# Patient Record
Sex: FEMALE | URBAN - METROPOLITAN AREA
[De-identification: names, ages, dates, MRNs, and addresses within clinical notes are randomized per-mention and may not be internally consistent; named-entity substitution may affect disease eponyms.]

---

## 2017-08-21 DIAGNOSIS — O36.80X0 ENCOUNTER TO DETERMINE FETAL VIABILITY OF PREGNANCY, NOT APPLICABLE OR UNSPECIFIED FETUS: Primary | ICD-10-CM

## 2017-08-22 ENCOUNTER — TRANSFERRED RECORDS (OUTPATIENT)
Dept: HEALTH INFORMATION MANAGEMENT | Facility: CLINIC | Age: 37
End: 2017-08-22

## 2017-08-22 ENCOUNTER — PRENATAL OFFICE VISIT (OUTPATIENT)
Dept: OBGYN | Facility: CLINIC | Age: 37
End: 2017-08-22
Payer: COMMERCIAL

## 2017-08-22 ENCOUNTER — RADIANT APPOINTMENT (OUTPATIENT)
Dept: ULTRASOUND IMAGING | Facility: CLINIC | Age: 37
End: 2017-08-22
Payer: COMMERCIAL

## 2017-08-22 ENCOUNTER — TELEPHONE (OUTPATIENT)
Dept: NURSING | Facility: CLINIC | Age: 37
End: 2017-08-22

## 2017-08-22 VITALS
WEIGHT: 132.6 LBS | BODY MASS INDEX: 26.73 KG/M2 | HEIGHT: 59 IN | DIASTOLIC BLOOD PRESSURE: 70 MMHG | SYSTOLIC BLOOD PRESSURE: 114 MMHG

## 2017-08-22 DIAGNOSIS — Z3A.13 13 WEEKS GESTATION OF PREGNANCY: ICD-10-CM

## 2017-08-22 DIAGNOSIS — O09.291 HISTORY OF GESTATIONAL DIABETES IN PRIOR PREGNANCY, CURRENTLY PREGNANT, FIRST TRIMESTER: ICD-10-CM

## 2017-08-22 DIAGNOSIS — R00.2 PALPITATIONS: ICD-10-CM

## 2017-08-22 DIAGNOSIS — Z86.32 HISTORY OF GESTATIONAL DIABETES IN PRIOR PREGNANCY, CURRENTLY PREGNANT, FIRST TRIMESTER: ICD-10-CM

## 2017-08-22 DIAGNOSIS — Z98.891 H/O CESAREAN SECTION: ICD-10-CM

## 2017-08-22 DIAGNOSIS — O36.80X0 ENCOUNTER TO DETERMINE FETAL VIABILITY OF PREGNANCY, NOT APPLICABLE OR UNSPECIFIED FETUS: ICD-10-CM

## 2017-08-22 DIAGNOSIS — Z12.4 SCREENING FOR CERVICAL CANCER: ICD-10-CM

## 2017-08-22 DIAGNOSIS — B37.31 YEAST VAGINITIS: ICD-10-CM

## 2017-08-22 DIAGNOSIS — O09.529 SUPERVISION OF HIGH-RISK PREGNANCY OF ELDERLY MULTIGRAVIDA: Primary | ICD-10-CM

## 2017-08-22 DIAGNOSIS — O09.291 HX OF PREECLAMPSIA, PRIOR PREGNANCY, CURRENTLY PREGNANT, FIRST TRIMESTER: ICD-10-CM

## 2017-08-22 PROBLEM — O09.299 HISTORY OF GESTATIONAL DIABETES IN PRIOR PREGNANCY, CURRENTLY PREGNANT: Status: ACTIVE | Noted: 2017-08-22

## 2017-08-22 LAB
ABO + RH BLD: NORMAL
ABO + RH BLD: NORMAL
ALBUMIN UR-MCNC: NEGATIVE MG/DL
APPEARANCE UR: CLEAR
BACTERIA #/AREA URNS HPF: ABNORMAL /HPF
BASOPHILS # BLD AUTO: 0 10E9/L (ref 0–0.2)
BASOPHILS NFR BLD AUTO: 0.1 %
BILIRUB UR QL STRIP: NEGATIVE
BLD GP AB SCN SERPL QL: NORMAL
BLOOD BANK CMNT PATIENT-IMP: NORMAL
COLOR UR AUTO: YELLOW
DIFFERENTIAL METHOD BLD: NORMAL
EOSINOPHIL # BLD AUTO: 0.1 10E9/L (ref 0–0.7)
EOSINOPHIL NFR BLD AUTO: 0.8 %
ERYTHROCYTE [DISTWIDTH] IN BLOOD BY AUTOMATED COUNT: 14 % (ref 10–15)
GLUCOSE 1H P 50 G GLC PO SERPL-MCNC: 158 MG/DL (ref 60–129)
GLUCOSE UR STRIP-MCNC: 100 MG/DL
HCT VFR BLD AUTO: 35.4 % (ref 35–47)
HGB BLD-MCNC: 12.5 G/DL (ref 11.7–15.7)
HGB UR QL STRIP: ABNORMAL
KETONES UR STRIP-MCNC: NEGATIVE MG/DL
LEUKOCYTE ESTERASE UR QL STRIP: NEGATIVE
LYMPHOCYTES # BLD AUTO: 2.1 10E9/L (ref 0.8–5.3)
LYMPHOCYTES NFR BLD AUTO: 24.4 %
MCH RBC QN AUTO: 32.1 PG (ref 26.5–33)
MCHC RBC AUTO-ENTMCNC: 35.3 G/DL (ref 31.5–36.5)
MCV RBC AUTO: 91 FL (ref 78–100)
MONOCYTES # BLD AUTO: 0.4 10E9/L (ref 0–1.3)
MONOCYTES NFR BLD AUTO: 5.1 %
NEUTROPHILS # BLD AUTO: 5.9 10E9/L (ref 1.6–8.3)
NEUTROPHILS NFR BLD AUTO: 69.6 %
NITRATE UR QL: NEGATIVE
PH UR STRIP: 6.5 PH (ref 5–7)
PLATELET # BLD AUTO: 182 10E9/L (ref 150–450)
RBC # BLD AUTO: 3.9 10E12/L (ref 3.8–5.2)
RBC #/AREA URNS AUTO: ABNORMAL /HPF
SOURCE: ABNORMAL
SP GR UR STRIP: 1.01 (ref 1–1.03)
SPECIMEN EXP DATE BLD: NORMAL
SPECIMEN SOURCE: ABNORMAL
UROBILINOGEN UR STRIP-ACNC: 0.2 EU/DL (ref 0.2–1)
WBC # BLD AUTO: 8.4 10E9/L (ref 4–11)
WBC #/AREA URNS AUTO: ABNORMAL /HPF
WET PREP SPEC: ABNORMAL

## 2017-08-22 PROCEDURE — 82950 GLUCOSE TEST: CPT | Performed by: OBSTETRICS & GYNECOLOGY

## 2017-08-22 PROCEDURE — 87186 SC STD MICRODIL/AGAR DIL: CPT | Performed by: OBSTETRICS & GYNECOLOGY

## 2017-08-22 PROCEDURE — 36415 COLL VENOUS BLD VENIPUNCTURE: CPT | Performed by: OBSTETRICS & GYNECOLOGY

## 2017-08-22 PROCEDURE — 87389 HIV-1 AG W/HIV-1&-2 AB AG IA: CPT | Performed by: OBSTETRICS & GYNECOLOGY

## 2017-08-22 PROCEDURE — 86762 RUBELLA ANTIBODY: CPT | Performed by: OBSTETRICS & GYNECOLOGY

## 2017-08-22 PROCEDURE — G0145 SCR C/V CYTO,THINLAYER,RESCR: HCPCS | Performed by: OBSTETRICS & GYNECOLOGY

## 2017-08-22 PROCEDURE — 86900 BLOOD TYPING SEROLOGIC ABO: CPT | Performed by: OBSTETRICS & GYNECOLOGY

## 2017-08-22 PROCEDURE — 87086 URINE CULTURE/COLONY COUNT: CPT | Performed by: OBSTETRICS & GYNECOLOGY

## 2017-08-22 PROCEDURE — 86901 BLOOD TYPING SEROLOGIC RH(D): CPT | Performed by: OBSTETRICS & GYNECOLOGY

## 2017-08-22 PROCEDURE — 87624 HPV HI-RISK TYP POOLED RSLT: CPT | Performed by: OBSTETRICS & GYNECOLOGY

## 2017-08-22 PROCEDURE — 81001 URINALYSIS AUTO W/SCOPE: CPT | Performed by: OBSTETRICS & GYNECOLOGY

## 2017-08-22 PROCEDURE — 76815 OB US LIMITED FETUS(S): CPT | Performed by: OBSTETRICS & GYNECOLOGY

## 2017-08-22 PROCEDURE — 87491 CHLMYD TRACH DNA AMP PROBE: CPT | Performed by: OBSTETRICS & GYNECOLOGY

## 2017-08-22 PROCEDURE — 99000 SPECIMEN HANDLING OFFICE-LAB: CPT | Performed by: OBSTETRICS & GYNECOLOGY

## 2017-08-22 PROCEDURE — 87340 HEPATITIS B SURFACE AG IA: CPT | Performed by: OBSTETRICS & GYNECOLOGY

## 2017-08-22 PROCEDURE — 86780 TREPONEMA PALLIDUM: CPT | Performed by: OBSTETRICS & GYNECOLOGY

## 2017-08-22 PROCEDURE — 87210 SMEAR WET MOUNT SALINE/INK: CPT | Performed by: OBSTETRICS & GYNECOLOGY

## 2017-08-22 PROCEDURE — 40000791 ZZHCL STATISTIC VERIFI PRENATAL TRISOMY 21,18,13: Mod: 90 | Performed by: OBSTETRICS & GYNECOLOGY

## 2017-08-22 PROCEDURE — 87591 N.GONORRHOEAE DNA AMP PROB: CPT | Performed by: OBSTETRICS & GYNECOLOGY

## 2017-08-22 PROCEDURE — 84443 ASSAY THYROID STIM HORMONE: CPT | Performed by: OBSTETRICS & GYNECOLOGY

## 2017-08-22 PROCEDURE — 87088 URINE BACTERIA CULTURE: CPT | Performed by: OBSTETRICS & GYNECOLOGY

## 2017-08-22 PROCEDURE — 86850 RBC ANTIBODY SCREEN: CPT | Performed by: OBSTETRICS & GYNECOLOGY

## 2017-08-22 PROCEDURE — 85025 COMPLETE CBC W/AUTO DIFF WBC: CPT | Performed by: OBSTETRICS & GYNECOLOGY

## 2017-08-22 PROCEDURE — 99207 ZZC FIRST OB VISIT: CPT | Performed by: OBSTETRICS & GYNECOLOGY

## 2017-08-22 RX ORDER — PRENATAL VIT/IRON FUM/FOLIC AC 27MG-0.8MG
1 TABLET ORAL DAILY
COMMUNITY
End: 2018-04-09

## 2017-08-22 RX ORDER — FLUCONAZOLE 150 MG/1
150 TABLET ORAL
Qty: 2 TABLET | Refills: 0 | Status: SHIPPED | OUTPATIENT
Start: 2017-08-22 | End: 2017-08-26

## 2017-08-22 NOTE — NURSING NOTE
[facility] Obstetrical Risk History    Patient presents for new OB labs and teaching.  This is the patient's 2ND pregnancy.    1. Please indicate any condition you have or have had in the past:  Gestational Diabetes, Depression, Preeclampsia  2. Do you smoke?  No  If yes, how many packs/day?   3. Do you drink alcoholic beverages? No  If yes, how often?   What type?   4. List any medications taken since your last period: prenatal   5. List any recreational drugs (cocaine, marijuana, etc. used since your last period:  none  6. List any chemical or radiation exposure that you've encountered: none  7. Are you on a restricted diet? No  If yes, please describe:   Do you have any Bahai objections to any form of treatment? No    GENETIC SCREENING    These questions apply to you, the baby's father, or anyone in either family with:    1. Patient's age 35 or greater at delivery? Yes  2. Algerian, Icelandic, Mediterranean ancestry? No  3. Neural Tube Defect (Meningomyelocele, Spina Bifida, or Anencephaly)? No  4. Mormon, Guyanese Twiggs or history of Claudy-Sachs disease? No  5. Down's Syndrome? No  6. Hemophilia or clotting disorder? No  7. Muscular Dystrophy? No  8. Cystic Fibrosis? No  9. [place]'s Chorea? No  10. Mental Retardation? No  11. 3 or more miscarriages or a stillborn? No  12. Other inherited disease or chromosomal disorder? No  13. Have you or the baby's father had a child born with a birth defect? No  14. Did you or the baby's father have a birth defect yourselves? No    Do you have any other concerns about birth defects? No

## 2017-08-22 NOTE — LETTER
September 3, 2017    Rosmery Garcia  [address] [facility] [address]    Dear Rosmery,  We are happy to inform you that your PAP smear result from 8/22/17 is normal.  We are now able to do a follow up test on PAP smears. The DNA test is for HPV (Human Papilloma Virus). Cervical cancer is closely linked with certain types of HPV. Your result showed no evidence of high risk HPV.  Therefore we recommend you return in 3 years for your next pap smear.  You will still need to return to the clinic every year for an annual exam and other preventive tests.  Please contact the clinic at [phone] with any questions.  Sincerely,    Maria Ines Delong MD/julio

## 2017-08-22 NOTE — MR AVS SNAPSHOT
After Visit Summary   2017    Rosmery Garcia    MRN: [id number]           Patient Information     Date Of Birth          1980        Visit Information        Provider Department      2017 9:50 AM Maria Ines Delong MD; [facility]        Today's Diagnoses     Supervision of high-risk pregnancy of elderly multigravida    -  1    13 weeks gestation of pregnancy        H/O  section        Hx of preeclampsia, prior pregnancy, currently pregnant, first trimester        History of gestational diabetes in prior pregnancy, currently pregnant, first trimester        Palpitations        Yeast vaginitis        Screening for cervical cancer          Care Instructions    Please  aspirin from the pharmacy    I will call you with any abnormal lab results.          Follow-ups after your visit        Follow-up notes from your care team     Return in about 4 weeks (around 2017) for Prenatal Care.      Your next 10 appointments already scheduled     Sep 21, 2017  8:30 AM CDT   ESTABLISHED PRENATAL with Maria Ines Delong MD   [facility] ([facility])    [address] [place] [id number] [phone]              Who to contact     If you have questions or need follow up information about today's clinic visit or your schedule please contact [facility] [place] directly at [phone].  Normal or non-critical lab and imaging results will be communicated to you by MyChart, letter or phone within 4 business days after the clinic has received the results. If you do not hear from us within 7 days, please contact the clinic through MyChart or phone. If you have a critical or abnormal lab result, we will notify you by phone as soon as possible.  Submit refill requests through [employer] or call your pharmacy and they will forward the refill request to us. Please  "allow 3 business days for your refill to be completed.          Additional Information About Your Visit        MyChart Information     [employer] gives you secure access to your electronic health record. If you see a primary care provider, you can also send messages to your care team and make appointments. If you have questions, please call your primary care clinic.  If you do not have a primary care provider, please call [phone] and they will assist you.        Care EveryWhere ID     This is your Care EveryWhere ID. This could be used by other organizations to access your [place] medical records  [id number]        Your Vitals Were     Height Last Period BMI (Body Mass Index)             4' 10.5\" (1.486 m) 05/18/2017 (Exact Date) 27.24 kg/m2          Blood Pressure from Last 3 Encounters:   08/22/17 114/70    Weight from Last 3 Encounters:   08/22/17 132 lb 9.6 oz (60.1 kg)              We Performed the Following     ABO/Rh type and screen     Anti Treponema     CBC with platelets differential     CHLAMYDIA TRACHOMATIS PCR     Glucose tolerance, gest screen, 1 hour     Hepatitis B surface antigen     HIV Antigen Antibody Combo     HPV High Risk Types DNA Cervical     NEISSERIA GONORRHOEA PCR     Non Invasive Prenatal Test Cell Free DNA     Pap imaged thin layer screen with HPV - recommended age 30 - 65 years (select HPV order below)     Rubella Antibody IgG Quantitative     TSH with free T4 reflex     UA with Microscopic     Urine Culture Aerobic Bacterial     Wet prep          Today's Medication Changes          These changes are accurate as of: 8/22/17  1:04 PM.  If you have any questions, ask your nurse or doctor.               Start taking these medicines.        Dose/Directions    aspirin 81 MG tablet   Used for:  Supervision of high-risk pregnancy of elderly multigravida   Started by:  Maria Ines Delong MD        Dose:  81 mg   Take 1 tablet (81 mg) by mouth daily   Quantity:  90 tablet "   Refills:  3       fluconazole 150 MG tablet   Commonly known as:  DIFLUCAN   Used for:  Yeast vaginitis   Started by:  Maria Ines Delong MD        Dose:  150 mg   Take 1 tablet (150 mg) by mouth every 72 hours for 2 doses   Quantity:  2 tablet   Refills:  0            Where to get your medicines      These medications were sent to [facility]/pharmacy #[id number] [place], [place] - [address] [facility]  [address] [facility] [address]     Phone:  [phone]     aspirin 81 MG tablet    fluconazole 150 MG tablet                Primary Care Provider    None Specified       No primary provider on file.        Equal Access to Services     [facility]: Hadii jefe Tinoco, racheal frank, az kaalmabri carrillo, selvin baig . So [facility] [phone].    ATENCIÓN: Si habla español, tiene a thompson disposición servicios gratuitos de asistencia lingüística. [facility] [phone].    We comply with applicable federal civil rights laws and [place] laws. We do not discriminate on the basis of race, color, national origin, age, disability sex, sexual orientation or gender identity.            Thank you!     Thank you for choosing [facility] [place]  for your care. Our goal is always to provide you with excellent care. Hearing back from our patients is one way we can continue to improve our services. Please take a few minutes to complete the written survey that you may receive in the mail after your visit with us. Thank you!             Your Updated Medication List - Protect others around you: Learn how to safely use, store and throw away your medicines at www.disposemymeds.org.          This list is accurate as of: 8/22/17  1:04 PM.  Always use your most recent med list.                   Brand Name Dispense Instructions for use Diagnosis    aspirin 81 MG tablet     90 tablet    Take 1 tablet (81 mg) by mouth daily    Supervision of high-risk pregnancy of  elderly multigravida       fluconazole 150 MG tablet    DIFLUCAN    2 tablet    Take 1 tablet (150 mg) by mouth every 72 hours for 2 doses    Yeast vaginitis       prenatal multivitamin plus iron 27-0.8 MG Tabs per tablet      Take 1 tablet by mouth daily

## 2017-08-22 NOTE — TELEPHONE ENCOUNTER
Pt's  called back about one hour glucose testing done today and the results. Pt's  informed that pt needs 3 hr GTT. Reviewed the instruction sheet with pt's . Requested that I email the sheet to them. Transferred to scheduling.

## 2017-08-22 NOTE — PROGRESS NOTES
Initial OB Visit    Chief Complaint: This is a 36 year old female patient,  at 13w 5d by 13 week sonogram consistent with LMP  who presents for her first obstetrical visit.    HPI:  Patient's last menstrual period was 2017 (exact date)..  This gives her an EDC of 2018 .  She is 13w5d weeks.  Her cycles are regular.  Her last menstrual period was normal.  She has had an ultrasound on 17 which showed viable IUP measuring 13w3d. .  Since her LMP, she has experienced  fatigue).  She denies nausea and vaginal bleeding.  Today she notes foul smelling urine for the past year or so. She denies any painful urination. She endorses vaginal itching that is intermittent and was last present yesterday. She is here today with her  and her 8 year old son. They are interested in genetic screening and they would like to have a trial of labor with this pregnancy.    Past History:  Her past medical history   Past Medical History:   Diagnosis Date     Depression      Gestational diabetes      Preeclampsia    .      She has a history of  preeclampsia, gestational diabetes, controlled by oral medications and prior  section    Since her last LMP she denies use of alcohol, tobacco and street drugs.    HISTORY:  Obstetric History       T1      L1     SAB0   TAB0   Ectopic0   Multiple0   Live Births1       # Outcome Date GA Lbr Franco/2nd Weight Sex Delivery Anes PTL Lv   2 Current            1 Term 09 38w0d  7 lb (3.175 kg) M -SEC  N MARGRET        Past Medical History:   Diagnosis Date     Depression      Gestational diabetes      Preeclampsia      Past Surgical History:   Procedure Laterality Date      SECTION       Family History   Problem Relation Age of Onset     DIABETES Mother      DIABETES Brother      Social History     Social History     Marital status:      Spouse name: N/A     Number of children: N/A     Years of education: N/A     Social History  "Main Topics     Smoking status: Never Smoker     Smokeless tobacco: Never Used     Alcohol use No     Drug use: No     Sexual activity: Yes     Partners: Male     Other Topics Concern     None     Social History Narrative     None     Current Outpatient Prescriptions   Medication Sig     Prenatal Vit-Fe Fumarate-FA (PRENATAL MULTIVITAMIN PLUS IRON) 27-0.8 MG TABS per tablet Take 1 tablet by mouth daily     aspirin 81 MG tablet Take 1 tablet (81 mg) by mouth daily     fluconazole (DIFLUCAN) 150 MG tablet Take 1 tablet (150 mg) by mouth every 72 hours for 2 doses     No current facility-administered medications for this visit.      No Known Allergies    Past medical, surgical, social and family history were reviewed and updated in EPIC.    ROS:   12 point review of systems negative other than symptoms noted below.  Constitutional: Fatigue  Cardiovascular: Palpitations   Genitourinary: vaginal itching    EXAM:  /70 (BP Location: Left arm, Patient Position: Chair, Cuff Size: Adult Regular)  Ht 4' 10.5\" (1.486 m)  Wt 132 lb 9.6 oz (60.1 kg)  LMP 05/18/2017 (Exact Date)  BMI 27.24 kg/m2   BMI: Body mass index is 27.24 kg/(m^2).    EXAM:  Constitutional:  Appearance: Well nourished, well developed alert, in no acute distress.  Neck:   Lymph Nodes:  No lymphadenopathy present.    Thyroid:  Gland size normal, nontender, no nodules or masses present  on palpation.  Chest:  Respiratory Effort:  Breathing unlabored.  Cardiovascular:  Heart Auscultation:  Regular rate, normal rhythm, no murmurs    present.  Breasts: Inspection of Breasts:  No lymphadenopathy present., Palpation of Breasts and Axillae:  No masses present on palpation, no breast tenderness., Axillary Lymph Nodes:  No lymphadenopathy present. and No nodularity, asymmetry or nipple discharge bilaterally.    Axillary Lymph Nodes:  No lymphadenopathy present.  Gastrointestinal:  Abdominal Examination:  Abdomen nontender to palpation, tone  normal without " rigidity or guarding, no masses present, umbilicus without  Lesions.    Liver and speen:  No hepatomegaly present, liver nontender to  palpation.    Hernias:  No hernias present.  Lymphatic: Lymph Nodes:  No other lymphadenopathy present.  Skin:  General Inspection:  No rashes present, no lesions present, no areas of  discoloration.    Genitalia and Groin:  No rashes present, no lesions present, no areas of  discoloration, no masses present.  Neurologic/Psychiatric:    Mental Status:  Oriented X3.    Pelvic Exam:  External Genitalia:     Normal appearance for age, no discharge present, no tenderness present, no inflammatory lesions present, color normal  Vagina:     Normal vaginal vault without central or paravaginal defects, thick white discharge adherent to vaginal mucosa, no inflammatory lesions present, no masses present  Bladder:     Nontender to palpation  Urethra:   Urethral Body:  Urethra palpation normal, urethra structural support normal   Urethral Meatus:  No erythema or lesions present  Cervix:     Appearance healthy, no lesions present, nontender to palpation, no bleeding present. Closed/long and high. Pap smear collected.  Uterus:     Uterus: firm, normal sized and nontender, anteverted in position. 13 week in size.  Adnexa:     No adnexal tenderness present, no adnexal masses present  Perineum:     Perineum within normal limits, no evidence of trauma, no rashes or skin lesions present  Anus:     Anus within normal limits, no hemorrhoids present  Pubic Hair:     Normal pubic hair distribution for age  Genitalia and Groin:     No rashes present, no lesions present, no areas of discoloration, no masses present      ASSESSMENT:      ICD-10-CM    1. Supervision of high-risk pregnancy of elderly multigravida O09.529 ABO/Rh type and screen     Anti Treponema     CBC with platelets differential     Hepatitis B surface antigen     HIV Antigen Antibody Combo     UA with Microscopic     Urine Culture Aerobic  Bacterial     Rubella Antibody IgG Quantitative     Non Invasive Prenatal Test Cell Free DNA     NEISSERIA GONORRHOEA PCR     CHLAMYDIA TRACHOMATIS PCR     aspirin 81 MG tablet   2. 13 weeks gestation of pregnancy Z3A.13    3. H/O  section Z98.891    4. Hx of preeclampsia, prior pregnancy, currently pregnant, first trimester O09.291    5. History of gestational diabetes in prior pregnancy, currently pregnant, first trimester O09.291 Glucose tolerance, gest screen, 1 hour    Z86.32    6. Palpitations R00.2 TSH with free T4 reflex   7. Yeast vaginitis B37.3 Wet prep     fluconazole (DIFLUCAN) 150 MG tablet   8. Screening for cervical cancer Z12.4 Pap imaged thin layer screen with HPV - recommended age 30 - 65 years (select HPV order below)     HPV High Risk Types DNA Cervical     Rosmery and her  were counseled about decreasing the risk of preeclampsia with low dose aspirin. They were also counseled about an early screen for gestational diabetes. She was encouraged that she would be able to have a TOLAC unless there were other contraindications to having a vaginal delivery.  They opt for cell free DNA therefore this was ordered. Dating will be by her last menstrual period. First trimester teaching was completed today. Will follow up with the results of her blood work.   Diflucan was sent to her pharmacy for her positive wet prep result.    PLAN/PATIENT INSTRUCTIONS:    Patient Instructions   Please  aspirin from the pharmacy    I will call you with any abnormal lab results.        Maria Ines Delong MD  [facility]

## 2017-08-23 DIAGNOSIS — N30.00 ACUTE CYSTITIS WITHOUT HEMATURIA: Primary | ICD-10-CM

## 2017-08-23 LAB
C TRACH DNA SPEC QL NAA+PROBE: NEGATIVE
HBV SURFACE AG SERPL QL IA: NONREACTIVE
HIV 1+2 AB+HIV1 P24 AG SERPL QL IA: NONREACTIVE
N GONORRHOEA DNA SPEC QL NAA+PROBE: NEGATIVE
RUBV IGG SERPL IA-ACNC: 81 IU/ML
SPECIMEN SOURCE: NORMAL
SPECIMEN SOURCE: NORMAL
T PALLIDUM IGG+IGM SER QL: NEGATIVE
TSH SERPL DL<=0.005 MIU/L-ACNC: 0.62 MU/L (ref 0.4–4)

## 2017-08-23 RX ORDER — NITROFURANTOIN 25; 75 MG/1; MG/1
100 CAPSULE ORAL 2 TIMES DAILY
Qty: 14 CAPSULE | Refills: 0 | Status: SHIPPED | OUTPATIENT
Start: 2017-08-23 | End: 2017-09-21

## 2017-08-23 NOTE — PROGRESS NOTES
Patient was called about her urine culture result. Awaiting final sensitivities but will send Macrobid now and change antibiotic if needed.

## 2017-08-24 DIAGNOSIS — Z86.32 HISTORY OF GESTATIONAL DIABETES IN PRIOR PREGNANCY, CURRENTLY PREGNANT, SECOND TRIMESTER: Primary | ICD-10-CM

## 2017-08-24 DIAGNOSIS — O99.810 ABNORMAL GLUCOSE COMPLICATING PREGNANCY: ICD-10-CM

## 2017-08-24 DIAGNOSIS — O09.292 HISTORY OF GESTATIONAL DIABETES IN PRIOR PREGNANCY, CURRENTLY PREGNANT, SECOND TRIMESTER: Primary | ICD-10-CM

## 2017-08-24 LAB
BACTERIA SPEC CULT: ABNORMAL
BACTERIA SPEC CULT: ABNORMAL
COPATH REPORT: NORMAL
Lab: ABNORMAL
PAP: NORMAL
SPECIMEN SOURCE: ABNORMAL

## 2017-08-24 PROCEDURE — 82951 GLUCOSE TOLERANCE TEST (GTT): CPT | Performed by: OBSTETRICS & GYNECOLOGY

## 2017-08-24 PROCEDURE — 36415 COLL VENOUS BLD VENIPUNCTURE: CPT | Performed by: OBSTETRICS & GYNECOLOGY

## 2017-08-24 PROCEDURE — 82952 GTT-ADDED SAMPLES: CPT | Performed by: OBSTETRICS & GYNECOLOGY

## 2017-08-25 LAB
GLUCOSE 1H P 100 G GLC PO SERPL-MCNC: 168 MG/DL (ref 60–179)
GLUCOSE 2H P 100 G GLC PO SERPL-MCNC: 132 MG/DL (ref 60–154)
GLUCOSE 3H P 100 G GLC PO SERPL-MCNC: 117 MG/DL (ref 60–139)
GLUCOSE P FAST SERPL-MCNC: 98 MG/DL (ref 60–94)

## 2017-08-28 LAB
FINAL DIAGNOSIS: NORMAL
HPV HR 12 DNA CVX QL NAA+PROBE: NEGATIVE
HPV16 DNA SPEC QL NAA+PROBE: NEGATIVE
HPV18 DNA SPEC QL NAA+PROBE: NEGATIVE
SPECIMEN DESCRIPTION: NORMAL

## 2017-08-29 ENCOUNTER — TELEPHONE (OUTPATIENT)
Dept: NURSING | Facility: CLINIC | Age: 37
End: 2017-08-29

## 2017-08-29 DIAGNOSIS — O09.529 SUPERVISION OF HIGH-RISK PREGNANCY OF ELDERLY MULTIGRAVIDA: ICD-10-CM

## 2017-08-29 NOTE — TELEPHONE ENCOUNTER
Innatal results: negative     Test    Result     Interpretation  Chromosome 21  No aneuploidy detected     Results consistent with two copies of chromosome 21  Chromosome 18  No aneuploidy detected  Results consistent with two copies of chromosome 18  Chromosome 13  No aneuploidy detected  Results consistent with two copies of chromosome 13  Sex Chromosome  No aneuploidy detected  Results consistent with two sex chromosomes (XX)- female    LMTCB.

## 2017-09-01 NOTE — TELEPHONE ENCOUNTER
Pt's  calling (Alvaro Arnett), do have consent to communicate on file, verified his wife's name and date of birth and informed him of baby's negative screening results. Per his request, gender was revealed over the telephone. Alvaro would like for clinic to call back ([phone]) and leave a message on his voicemail with the results and gender so his wife can listen to it. Called 's phone number and left message on voicemail informing of negative screening results and gender. Updated OB Sticky Note and Problem List. Results have already been scanned into pt's medical chart.

## 2017-09-21 ENCOUNTER — PRENATAL OFFICE VISIT (OUTPATIENT)
Dept: OBGYN | Facility: CLINIC | Age: 37
End: 2017-09-21
Payer: COMMERCIAL

## 2017-09-21 VITALS — DIASTOLIC BLOOD PRESSURE: 74 MMHG | SYSTOLIC BLOOD PRESSURE: 110 MMHG | WEIGHT: 133 LBS | BODY MASS INDEX: 27.32 KG/M2

## 2017-09-21 DIAGNOSIS — N30.00 ACUTE CYSTITIS WITHOUT HEMATURIA: ICD-10-CM

## 2017-09-21 DIAGNOSIS — O09.292 HISTORY OF GESTATIONAL DIABETES IN PRIOR PREGNANCY, CURRENTLY PREGNANT, SECOND TRIMESTER: ICD-10-CM

## 2017-09-21 DIAGNOSIS — O09.529 SUPERVISION OF HIGH-RISK PREGNANCY OF ELDERLY MULTIGRAVIDA: Primary | ICD-10-CM

## 2017-09-21 DIAGNOSIS — Z3A.18 18 WEEKS GESTATION OF PREGNANCY: ICD-10-CM

## 2017-09-21 DIAGNOSIS — Z86.32 HISTORY OF GESTATIONAL DIABETES IN PRIOR PREGNANCY, CURRENTLY PREGNANT, SECOND TRIMESTER: ICD-10-CM

## 2017-09-21 DIAGNOSIS — Z23 NEED FOR PROPHYLACTIC VACCINATION AND INOCULATION AGAINST INFLUENZA: ICD-10-CM

## 2017-09-21 PROCEDURE — 87086 URINE CULTURE/COLONY COUNT: CPT | Performed by: OBSTETRICS & GYNECOLOGY

## 2017-09-21 PROCEDURE — 90471 IMMUNIZATION ADMIN: CPT | Performed by: OBSTETRICS & GYNECOLOGY

## 2017-09-21 PROCEDURE — 87088 URINE BACTERIA CULTURE: CPT | Performed by: OBSTETRICS & GYNECOLOGY

## 2017-09-21 PROCEDURE — 99207 ZZC PRENATAL VISIT: CPT | Performed by: OBSTETRICS & GYNECOLOGY

## 2017-09-21 PROCEDURE — 87186 SC STD MICRODIL/AGAR DIL: CPT | Performed by: OBSTETRICS & GYNECOLOGY

## 2017-09-21 PROCEDURE — 90686 IIV4 VACC NO PRSV 0.5 ML IM: CPT | Performed by: OBSTETRICS & GYNECOLOGY

## 2017-09-21 NOTE — PROGRESS NOTES
Prenatal Visit  Doing well. No fetal movement as of yet. Took entire course of macrobid. cfDNA normal, female. No questions. Accepting of the flu vaccine today    Please see flowsheet.    Fetal heart tones auscultated. 150bpm    Doing well. Discussed decreased glucose tolerance. Will plan to screen for gestational diabetes at 24 weeks. Encouraged to have a healthy diet and to increase activity  Will repeat urine culture today    Anatomy sonogram in 2 weeks    Maria Ines Delong MD

## 2017-09-21 NOTE — PROGRESS NOTES
Injectable Influenza Immunization Documentation    1.  Is the person to be vaccinated sick today?   No    2. Does the person to be vaccinated have an allergy to a component   of the vaccine?   No    3. Has the person to be vaccinated ever had a serious reaction   to influenza vaccine in the past?   No    4. Has the person to be vaccinated ever had Guillain-Barré syndrome?   No    Form completed by Smiley Odell MA

## 2017-09-21 NOTE — MR AVS SNAPSHOT
After Visit Summary   9/21/2017    Rosmery Garcia    MRN: [id number]           Patient Information     Date Of Birth          1980        Visit Information        Provider Department      9/21/2017 8:30 AM Maria Ines Delong MD [facility] [place]        Today's Diagnoses     Supervision of high-risk pregnancy of elderly multigravida    -  1    History of gestational diabetes in prior pregnancy, currently pregnant, second trimester        Need for prophylactic vaccination and inoculation against influenza        Acute cystitis without hematuria           Follow-ups after your visit        Your next 10 appointments already scheduled     Oct 06, 2017  8:00 AM CDT   US PELVIC COMPLETE W TRANSVAGINAL with WEUS2   [facility] ([facility] [place])    [address] [place] [address] [phone]           Please bring a list of your medicines (including vitamins, minerals and over-the-counter drugs). Also, tell your doctor about any allergies you may have. Wear comfortable clothes and leave your valuables at home.  Adults: Drink six 8-ounce glasses of fluid one hour before your exam. Do NOT empty your bladder.  If you need to empty your bladder before your exam, try to release only a little bit of urine. Then, drink another 8oz glass of fluid.  Children: Children who are potty trained should drink at least 4 cups (32 oz) of liquid 45 minutes to one hour prior to the exam. The child s bladder must be full in order to achieve a diagnostic exam. If your child is very uncomfortable or has an urgent need to pee, please notify a technologist; they will try to find out how much longer the wait may be and provide instructions to help relieve the pressure. Occasionally it is medically necessary to insert a urinary catheter to fill the bladder.  Please call the Imaging Department at your exam site with any questions.             Oct 06, 2017  8:50 AM CDT   ESTABLISHED PRENATAL with Tomas Dixon MD   [facility] [place] ([facility] [place])    [address] [place] [address]  [place] [address] [phone]              Future tests that were ordered for you today     Open Future Orders        Priority Expected Expires Ordered    US OB > 14 Weeks Complete Single Routine 10/5/2017 9/21/2018 9/21/2017            Who to contact     If you have questions or need follow up information about today's clinic visit or your schedule please contact [facility] [place] directly at [phone].  Normal or non-critical lab and imaging results will be communicated to you by MyChart, letter or phone within 4 business days after the clinic has received the results. If you do not hear from us within 7 days, please contact the clinic through Message Bushart or phone. If you have a critical or abnormal lab result, we will notify you by phone as soon as possible.  Submit refill requests through Bucky Box or call your pharmacy and they will forward the refill request to us. Please allow 3 business days for your refill to be completed.          Additional Information About Your Visit        Message [employer] Information     Bucky Box gives you secure access to your electronic health record. If you see a primary care provider, you can also send messages to your care team and make appointments. If you have questions, please call your primary care clinic.  If you do not have a primary care provider, please call [phone] and they will assist you.        Care EveryWhere ID     This is your Care EveryWhere ID. This could be used by other organizations to access your [place] medical records  [id number]        Your Vitals Were     Last Period BMI (Body Mass Index)                05/18/2017 (Exact Date) 27.32 kg/m2           Blood Pressure from Last 3 Encounters:   09/21/17 110/74   08/22/17 114/70    Weight from Last 3 Encounters:    09/21/17 133 lb (60.3 kg)   08/22/17 132 lb 9.6 oz (60.1 kg)              We Performed the Following     ADMIN INFLUENZA (For MEDICARE Patients ONLY) []     FLU VAC, SPLIT VIRUS IM > 3 YO (QUADRIVALENT) [98338]     Urine Culture Aerobic Bacterial     Vaccine Administration, Initial [09220]     Vaccine Administration, Initial [47208]        Primary Care Provider    None Specified       No primary provider on file.        Equal Access to Services     FLORENCE FREIRE : Hadii jefe ngoo Earnest, wamynorda lujeimyadaha, qaybta kaalmada gerardo, selvin baig . So [facility] [phone].    ATENCIÓN: Si habla espzara, tiene a thompson disposición servicios gratuitos de asistencia lingüística. Llame al [phone].    We comply with applicable federal civil rights laws and [place] laws. We do not discriminate on the basis of race, color, national origin, age, disability sex, sexual orientation or gender identity.            Thank you!     Thank you for choosing [facility]  for your care. Our goal is always to provide you with excellent care. Hearing back from our patients is one way we can continue to improve our services. Please take a few minutes to complete the written survey that you may receive in the mail after your visit with us. Thank you!             Your Updated Medication List - Protect others around you: Learn how to safely use, store and throw away your medicines at www.disposemymeds.org.          This list is accurate as of: 9/21/17  9:09 AM.  Always use your most recent med list.                   Brand Name Dispense Instructions for use Diagnosis    aspirin 81 MG tablet     90 tablet    Take 1 tablet (81 mg) by mouth daily    Supervision of high-risk pregnancy of elderly multigravida       prenatal multivitamin plus iron 27-0.8 MG Tabs per tablet      Take 1 tablet by mouth daily

## 2017-09-23 LAB
BACTERIA SPEC CULT: ABNORMAL
Lab: ABNORMAL
SPECIMEN SOURCE: ABNORMAL

## 2017-09-25 RX ORDER — CEPHALEXIN 500 MG/1
500 CAPSULE ORAL EVERY 6 HOURS
Qty: 28 CAPSULE | Refills: 0 | Status: SHIPPED | OUTPATIENT
Start: 2017-09-25 | End: 2017-11-07

## 2017-09-25 NOTE — PROGRESS NOTES
Urine culture still positive after treating with macrobid. Will treat with cephalexin. Rosmery informed via voice message.

## 2017-10-02 ENCOUNTER — TELEPHONE (OUTPATIENT)
Dept: OBGYN | Facility: CLINIC | Age: 37
End: 2017-10-02

## 2017-10-02 NOTE — TELEPHONE ENCOUNTER
Pt's  states she sleeps more than 6 hours so has taken only 3 pills per day. Instructed pt's  to set alarm so that pills could be taken every 6 hours and complete the pills.

## 2017-10-02 NOTE — TELEPHONE ENCOUNTER
Please call  Alvaro  back to discuss RX or leave message on wife's phone. RX is more pills left than prescribed, not sure if she should continue.     Please call back today

## 2017-10-06 ENCOUNTER — RADIANT APPOINTMENT (OUTPATIENT)
Dept: ULTRASOUND IMAGING | Facility: CLINIC | Age: 37
End: 2017-10-06
Payer: COMMERCIAL

## 2017-10-06 ENCOUNTER — PRENATAL OFFICE VISIT (OUTPATIENT)
Dept: OBGYN | Facility: CLINIC | Age: 37
End: 2017-10-06
Payer: COMMERCIAL

## 2017-10-06 VITALS — SYSTOLIC BLOOD PRESSURE: 112 MMHG | BODY MASS INDEX: 27.9 KG/M2 | DIASTOLIC BLOOD PRESSURE: 62 MMHG | WEIGHT: 135.8 LBS

## 2017-10-06 DIAGNOSIS — Z86.32 HISTORY OF GESTATIONAL DIABETES IN PRIOR PREGNANCY, CURRENTLY PREGNANT: ICD-10-CM

## 2017-10-06 DIAGNOSIS — O09.529 SUPERVISION OF HIGH-RISK PREGNANCY OF ELDERLY MULTIGRAVIDA: ICD-10-CM

## 2017-10-06 DIAGNOSIS — O09.299 HISTORY OF GESTATIONAL DIABETES IN PRIOR PREGNANCY, CURRENTLY PREGNANT: ICD-10-CM

## 2017-10-06 DIAGNOSIS — O09.292 HISTORY OF PRE-ECLAMPSIA IN PRIOR PREGNANCY, CURRENTLY PREGNANT, SECOND TRIMESTER: ICD-10-CM

## 2017-10-06 DIAGNOSIS — Z36.9 ENCOUNTER FOR FETAL ULTRASOUND: Primary | ICD-10-CM

## 2017-10-06 LAB
ALBUMIN UR-MCNC: NEGATIVE MG/DL
APPEARANCE UR: CLEAR
BILIRUB UR QL STRIP: NEGATIVE
COLOR UR AUTO: YELLOW
GLUCOSE UR STRIP-MCNC: NEGATIVE MG/DL
HGB UR QL STRIP: ABNORMAL
KETONES UR STRIP-MCNC: NEGATIVE MG/DL
LEUKOCYTE ESTERASE UR QL STRIP: NEGATIVE
NITRATE UR QL: NEGATIVE
NON-SQ EPI CELLS #/AREA URNS LPF: ABNORMAL /LPF
PH UR STRIP: 6.5 PH (ref 5–7)
RBC #/AREA URNS AUTO: ABNORMAL /HPF
SOURCE: ABNORMAL
SP GR UR STRIP: <=1.005 (ref 1–1.03)
UROBILINOGEN UR STRIP-ACNC: 0.2 EU/DL (ref 0.2–1)
WBC #/AREA URNS AUTO: ABNORMAL /HPF

## 2017-10-06 PROCEDURE — 76805 OB US >/= 14 WKS SNGL FETUS: CPT | Performed by: OBSTETRICS & GYNECOLOGY

## 2017-10-06 PROCEDURE — 99207 ZZC PRENATAL VISIT: CPT | Performed by: OBSTETRICS & GYNECOLOGY

## 2017-10-06 PROCEDURE — 81001 URINALYSIS AUTO W/SCOPE: CPT | Performed by: OBSTETRICS & GYNECOLOGY

## 2017-10-06 NOTE — MR AVS SNAPSHOT
After Visit Summary   10/6/2017    Rosmery Garcia    MRN: [id number]           Patient Information     Date Of Birth          1980        Visit Information        Provider Department      10/6/2017 8:50 AM Tomas Dixon MD [facility] [place]        Today's Diagnoses     Encounter for fetal ultrasound    -  1    Supervision of high-risk pregnancy of elderly multigravida        History of gestational diabetes in prior pregnancy, currently pregnant        History of pre-eclampsia in prior pregnancy, currently pregnant, second trimester           Follow-ups after your visit        Your next 10 appointments already scheduled     Nov 02, 2017  8:40 AM CDT   ESTABLISHED PRENATAL with Maria Ines Delong MD   [facility] [place] ([facility])    [address] [place] [address]  [facility] [id number] [phone]              Who to contact     If you have questions or need follow up information about today's clinic visit or your schedule please contact [facility] [place] directly at [phone].  Normal or non-critical lab and imaging results will be communicated to you by UrbnDesignzhart, letter or phone within 4 business days after the clinic has received the results. If you do not hear from us within 7 days, please contact the clinic through MacroGenicst or phone. If you have a critical or abnormal lab result, we will notify you by phone as soon as possible.  Submit refill requests through EvalYou or call your pharmacy and they will forward the refill request to us. Please allow 3 business days for your refill to be completed.          Additional Information About Your Visit        UrbnDesignzhart Information     EvalYou gives you secure access to your electronic health record. If you see a primary care provider, you can also send messages to your care team and make appointments. If you have questions, please call your primary care  clinic.  If you do not have a primary care provider, please call [phone] and they will assist you.        Care EveryWhere ID     This is your Care EveryWhere ID. This could be used by other organizations to access your [place] medical records  [id number]        Your Vitals Were     Last Period BMI (Body Mass Index)                05/18/2017 (Exact Date) 27.9 kg/m2           Blood Pressure from Last 3 Encounters:   10/06/17 112/62   09/21/17 110/74   08/22/17 114/70    Weight from Last 3 Encounters:   10/06/17 135 lb 12.8 oz (61.6 kg)   09/21/17 133 lb (60.3 kg)   08/22/17 132 lb 9.6 oz (60.1 kg)              We Performed the Following     UA with Microscopic        Primary Care Provider    None Specified       No primary provider on file.        Equal Access to Services     FLORENCE FREIRE : Hadyolanda Tinoco, racheal frank, az carrillo, selvin baig . So [facility] [phone].    ATENCIÓN: Si habla español, tiene a thompson disposición servicios gratuitos de asistencia lingüística. Llame al [phone].    We comply with applicable federal civil rights laws and [place] laws. We do not discriminate on the basis of race, color, national origin, age, disability, sex, sexual orientation, or gender identity.            Thank you!     Thank you for choosing [facility] [place]  for your care. Our goal is always to provide you with excellent care. Hearing back from our patients is one way we can continue to improve our services. Please take a few minutes to complete the written survey that you may receive in the mail after your visit with us. Thank you!             Your Updated Medication List - Protect others around you: Learn how to safely use, store and throw away your medicines at www.disposemymeds.org.          This list is accurate as of: 10/6/17  1:27 PM.  Always use your most recent med list.                   Brand Name Dispense Instructions for  use Diagnosis    aspirin 81 MG tablet     90 tablet    Take 1 tablet (81 mg) by mouth daily    Supervision of high-risk pregnancy of elderly multigravida       prenatal multivitamin plus iron 27-0.8 MG Tabs per tablet      Take 1 tablet by mouth daily

## 2017-11-02 ENCOUNTER — PRENATAL OFFICE VISIT (OUTPATIENT)
Dept: OBGYN | Facility: CLINIC | Age: 37
End: 2017-11-02
Payer: COMMERCIAL

## 2017-11-02 VITALS — DIASTOLIC BLOOD PRESSURE: 70 MMHG | BODY MASS INDEX: 28.56 KG/M2 | WEIGHT: 139 LBS | SYSTOLIC BLOOD PRESSURE: 108 MMHG

## 2017-11-02 DIAGNOSIS — O09.299 HISTORY OF GESTATIONAL DIABETES IN PRIOR PREGNANCY, CURRENTLY PREGNANT: ICD-10-CM

## 2017-11-02 DIAGNOSIS — Z86.32 HISTORY OF GESTATIONAL DIABETES IN PRIOR PREGNANCY, CURRENTLY PREGNANT: ICD-10-CM

## 2017-11-02 DIAGNOSIS — N89.8 ITCHING OF VAGINA: ICD-10-CM

## 2017-11-02 DIAGNOSIS — Z3A.24 24 WEEKS GESTATION OF PREGNANCY: ICD-10-CM

## 2017-11-02 DIAGNOSIS — O23.42 UTI (URINARY TRACT INFECTION) DURING PREGNANCY, SECOND TRIMESTER: ICD-10-CM

## 2017-11-02 DIAGNOSIS — Z36.9 ENCOUNTER FOR ANTENATAL SCREENING OF MOTHER: ICD-10-CM

## 2017-11-02 DIAGNOSIS — Z98.891 H/O CESAREAN SECTION: ICD-10-CM

## 2017-11-02 DIAGNOSIS — N89.8 VAGINAL INCLUSION CYST: ICD-10-CM

## 2017-11-02 DIAGNOSIS — O09.529 SUPERVISION OF HIGH-RISK PREGNANCY OF ELDERLY MULTIGRAVIDA: Primary | ICD-10-CM

## 2017-11-02 LAB
SPECIMEN SOURCE: NORMAL
WET PREP SPEC: NORMAL

## 2017-11-02 PROCEDURE — 87210 SMEAR WET MOUNT SALINE/INK: CPT | Performed by: OBSTETRICS & GYNECOLOGY

## 2017-11-02 PROCEDURE — 87086 URINE CULTURE/COLONY COUNT: CPT | Performed by: OBSTETRICS & GYNECOLOGY

## 2017-11-02 PROCEDURE — 87186 SC STD MICRODIL/AGAR DIL: CPT | Performed by: OBSTETRICS & GYNECOLOGY

## 2017-11-02 PROCEDURE — 87088 URINE BACTERIA CULTURE: CPT | Performed by: OBSTETRICS & GYNECOLOGY

## 2017-11-02 PROCEDURE — 99207 ZZC PRENATAL VISIT: CPT | Performed by: OBSTETRICS & GYNECOLOGY

## 2017-11-02 RX ORDER — FLUCONAZOLE 150 MG/1
150 TABLET ORAL ONCE
Qty: 1 TABLET | Refills: 0 | Status: SHIPPED | OUTPATIENT
Start: 2017-11-02 | End: 2017-11-02

## 2017-11-02 NOTE — PROGRESS NOTES
Prenatal Visit  Rosmery notes some vaginal itching today. She denies any vaginal bleeding or burning on urination. She does endorse some insertional dyspareunia.    SSE: on parting of labia minora there is a 0.5 cm cyst located inferior to the urethral opening. Slightly tender to palpation. There is white thick vaginal discharge in the vaginal vault.  Cervix is closed/long and high    37 yo  at 24w0d with history of prior c/s here for prenatal care  --Vaginal itching: will treat presumptively for vaginal candidiasis and await wet prep  --Persistent UTI: in the setting of her exam today with a possible urethral diverticulum versus [place]'s duct cyst vs vaginal inclusion cyst. Will repeat her urine culture today. Will manage this finding expectantly and consider removal after delivery.  --History of GDM: she failed the 1 hour earlier in pregnancy. Will give the 3 hour GTT, she will return for this as she was not fasting today.  RTC for glucola and in 4 weeks for follow up pending normal glucola.  Maria Ines Delong MD

## 2017-11-02 NOTE — MR AVS SNAPSHOT
After Visit Summary   2017    Rosmery Garcia    MRN: [id number]           Patient Information     Date Of Birth          1980        Visit Information        Provider Department      2017 8:40 AM Maria Ines Delong MD [facility] [place]        Today's Diagnoses     Supervision of high-risk pregnancy of elderly multigravida    -  1    Vaginal inclusion cyst        History of gestational diabetes in prior pregnancy, currently pregnant        UTI (urinary tract infection) during pregnancy, second trimester        Encounter for  screening of mother        Gestational diabetes           Follow-ups after your visit        Your next 10 appointments already scheduled     2017  8:10 AM CST   Glucose Tolerance Test with WE LAB   [facility]    [address] [place] [address] [address]  [facility] [address] [phone]              Who to contact     If you have questions or need follow up information about today's clinic visit or your schedule please contact [facility] [place] directly at [phone].  Normal or non-critical lab and imaging results will be communicated to you by GroupZoomhart, letter or phone within 4 business days after the clinic has received the results. If you do not hear from us within 7 days, please contact the clinic through GroupZoomhart or phone. If you have a critical or abnormal lab result, we will notify you by phone as soon as possible.  Submit refill requests through [employer] or call your pharmacy and they will forward the refill request to us. Please allow 3 business days for your refill to be completed.          Additional Information About Your Visit        GroupZoomhart Information     [employer] gives you secure access to your electronic health record. If you see a primary care provider, you can also send messages to your care team and make appointments. If you have  questions, please call your primary care clinic.  If you do not have a primary care provider, please call [phone] and they will assist you.        Care EveryWhere ID     This is your Care EveryWhere ID. This could be used by other organizations to access your [place] medical records  [id number]        Your Vitals Were     Last Period BMI (Body Mass Index)                05/18/2017 (Exact Date) 28.56 kg/m2           Blood Pressure from Last 3 Encounters:   11/02/17 108/70   10/06/17 112/62   09/21/17 110/74    Weight from Last 3 Encounters:   11/02/17 139 lb (63 kg)   10/06/17 135 lb 12.8 oz (61.6 kg)   09/21/17 133 lb (60.3 kg)              We Performed the Following     Urine Culture Aerobic Bacterial        Primary Care Provider Office Phone # Fax #    [facility] [place] [facility] [phone]       [address] [address]        Equal Access to Services     FLORENCE FREIRE : Hadii aad ku hadasho Soomaali, waaxda luqadaha, qaybta kaalmada adeegyada, waxke pinedo haykelsea baig . So [facility] [phone].    ATENCIÓN: Si habla español, tiene a thompson disposición servicios gratuitos de asistencia lingüística. Llame al [phone].    We comply with applicable federal civil rights laws and [place] laws. We do not discriminate on the basis of race, color, national origin, age, disability, sex, sexual orientation, or gender identity.            Thank you!     Thank you for choosing [facility] [place]  for your care. Our goal is always to provide you with excellent care. Hearing back from our patients is one way we can continue to improve our services. Please take a few minutes to complete the written survey that you may receive in the mail after your visit with us. Thank you!             Your Updated Medication List - Protect others around you: Learn how to safely use, store and throw away your medicines at  www.disposemymeds.org.          This list is accurate as of: 11/2/17  9:35 AM.  Always use your most recent med list.                   Brand Name Dispense Instructions for use Diagnosis    aspirin 81 MG tablet     90 tablet    Take 1 tablet (81 mg) by mouth daily    Supervision of high-risk pregnancy of elderly multigravida       prenatal multivitamin plus iron 27-0.8 MG Tabs per tablet      Take 1 tablet by mouth daily

## 2017-11-04 LAB
BACTERIA SPEC CULT: ABNORMAL
Lab: ABNORMAL
SPECIMEN SOURCE: ABNORMAL

## 2017-11-07 ENCOUNTER — TELEPHONE (OUTPATIENT)
Dept: NURSING | Facility: CLINIC | Age: 37
End: 2017-11-07

## 2017-11-07 DIAGNOSIS — N30.00 ACUTE CYSTITIS WITHOUT HEMATURIA: ICD-10-CM

## 2017-11-07 NOTE — PROGRESS NOTES
Please inform of results --still has some bacteria in her urine --lower than prior urine cultures and if she wasn't pregnant, this is below the threshold for treatment.  Because she is pregnant, I would recommend a repeat course of keflex x 7d.  Please send the same rx as last sent by Dr. Delong

## 2017-11-07 NOTE — TELEPHONE ENCOUNTER
"Result note: \"Please inform of results --still has some bacteria in her urine --lower than prior urine cultures and if she wasn't pregnant, this is below the threshold for treatment.  Because she is pregnant, I would recommend a repeat course of keflex x 7d.  Please send the same rx as last sent by Dr. Delong\"    LMTCB. Rx pended.   "

## 2017-11-10 DIAGNOSIS — O09.299 HISTORY OF GESTATIONAL DIABETES IN PRIOR PREGNANCY, CURRENTLY PREGNANT: ICD-10-CM

## 2017-11-10 DIAGNOSIS — R73.09 IMPAIRED GLUCOSE TOLERANCE TEST: Primary | ICD-10-CM

## 2017-11-10 DIAGNOSIS — Z86.32 HISTORY OF GESTATIONAL DIABETES IN PRIOR PREGNANCY, CURRENTLY PREGNANT: ICD-10-CM

## 2017-11-10 DIAGNOSIS — Z36.9 ENCOUNTER FOR ANTENATAL SCREENING OF MOTHER: ICD-10-CM

## 2017-11-10 LAB — HGB BLD-MCNC: 12 G/DL (ref 11.7–15.7)

## 2017-11-10 PROCEDURE — 86780 TREPONEMA PALLIDUM: CPT | Performed by: OBSTETRICS & GYNECOLOGY

## 2017-11-10 PROCEDURE — 82952 GTT-ADDED SAMPLES: CPT | Performed by: OBSTETRICS & GYNECOLOGY

## 2017-11-10 PROCEDURE — 82951 GLUCOSE TOLERANCE TEST (GTT): CPT | Performed by: OBSTETRICS & GYNECOLOGY

## 2017-11-10 PROCEDURE — 85018 HEMOGLOBIN: CPT | Performed by: OBSTETRICS & GYNECOLOGY

## 2017-11-10 PROCEDURE — 36415 COLL VENOUS BLD VENIPUNCTURE: CPT | Performed by: OBSTETRICS & GYNECOLOGY

## 2017-11-11 LAB
GLUCOSE 1H P 100 G GLC PO SERPL-MCNC: 164 MG/DL (ref 60–179)
GLUCOSE 2H P 100 G GLC PO SERPL-MCNC: 163 MG/DL (ref 60–154)
GLUCOSE 3H P 100 G GLC PO SERPL-MCNC: 143 MG/DL (ref 60–139)
GLUCOSE P FAST SERPL-MCNC: 114 MG/DL (ref 60–94)
T PALLIDUM IGG+IGM SER QL: NEGATIVE

## 2017-11-13 ENCOUNTER — TELEPHONE (OUTPATIENT)
Dept: NURSING | Facility: CLINIC | Age: 37
End: 2017-11-13

## 2017-11-13 DIAGNOSIS — O24.419 GESTATIONAL DIABETES: Primary | ICD-10-CM

## 2017-11-13 NOTE — TELEPHONE ENCOUNTER
"Result note: \"Notes Recorded by Maria Ines Delong MD on 11/13/2017 at 8:30 AM  Can we please coordinate diabetic education and management.\"    Diabetic Education Referral placed. [facility] message also sent to pt as pt has not returned call for results from 11/7/17 either.   "

## 2017-11-14 RX ORDER — CEPHALEXIN 500 MG/1
500 CAPSULE ORAL EVERY 6 HOURS
Qty: 28 CAPSULE | Refills: 0 | Status: SHIPPED | OUTPATIENT
Start: 2017-11-14 | End: 2017-12-07

## 2017-11-14 NOTE — TELEPHONE ENCOUNTER
Rx sent. Called pt, informed of result note. Pt stated understanding and had no further questions.

## 2017-11-14 NOTE — TELEPHONE ENCOUNTER
Pt informed and stated she did receive the my chart message. Pt given the number for GDM teaching [phone]. Pt stated she would call.

## 2017-11-21 ENCOUNTER — PRENATAL OFFICE VISIT (OUTPATIENT)
Dept: OBGYN | Facility: CLINIC | Age: 37
End: 2017-11-21
Payer: COMMERCIAL

## 2017-11-21 VITALS — BODY MASS INDEX: 28.97 KG/M2 | SYSTOLIC BLOOD PRESSURE: 120 MMHG | DIASTOLIC BLOOD PRESSURE: 68 MMHG | WEIGHT: 141 LBS

## 2017-11-21 DIAGNOSIS — O09.529 SUPERVISION OF HIGH-RISK PREGNANCY OF ELDERLY MULTIGRAVIDA: Primary | ICD-10-CM

## 2017-11-21 DIAGNOSIS — N39.0 URINARY TRACT INFECTION WITHOUT HEMATURIA, SITE UNSPECIFIED: ICD-10-CM

## 2017-11-21 DIAGNOSIS — O09.291 HX OF PREECLAMPSIA, PRIOR PREGNANCY, CURRENTLY PREGNANT, FIRST TRIMESTER: ICD-10-CM

## 2017-11-21 DIAGNOSIS — Z3A.26 26 WEEKS GESTATION OF PREGNANCY: ICD-10-CM

## 2017-11-21 DIAGNOSIS — N89.8 VAGINAL INCLUSION CYST: ICD-10-CM

## 2017-11-21 DIAGNOSIS — Z98.891 H/O CESAREAN SECTION: ICD-10-CM

## 2017-11-21 DIAGNOSIS — O24.410 DIET CONTROLLED GESTATIONAL DIABETES MELLITUS (GDM) IN SECOND TRIMESTER: ICD-10-CM

## 2017-11-21 PROBLEM — O24.414 INSULIN CONTROLLED GESTATIONAL DIABETES MELLITUS (GDM) IN SECOND TRIMESTER: Status: ACTIVE | Noted: 2017-11-21

## 2017-11-21 PROBLEM — O24.414 INSULIN CONTROLLED GESTATIONAL DIABETES MELLITUS (GDM) IN SECOND TRIMESTER: Status: RESOLVED | Noted: 2017-11-21 | Resolved: 2017-11-21

## 2017-11-21 PROCEDURE — 99207 ZZC PRENATAL VISIT: CPT | Performed by: OBSTETRICS & GYNECOLOGY

## 2017-11-21 NOTE — MR AVS SNAPSHOT
After Visit Summary   2017    Rosmery Garcia    MRN: [id number]           Patient Information     Date Of Birth          1980        Visit Information        Provider Department      2017 8:50 AM Maria Ines Delong MD [facility] [place]        Today's Diagnoses     Supervision of high-risk pregnancy of elderly multigravida    -  1    Diet controlled gestational diabetes mellitus (GDM) in second trimester        Vaginal inclusion cyst        Urinary tract infection without hematuria, site unspecified        H/O  section        Hx of preeclampsia, prior pregnancy, currently pregnant, first trimester        26 weeks gestation of pregnancy           Follow-ups after your visit        Follow-up notes from your care team     Return in about 1 week (around 2017).      Your next 10 appointments already scheduled     Dec 07, 2017  8:30 AM CST   Office Visit with Maria Ines Delong MD   [facility] [place] ([facility] [place])    [address] [place] [id number] [phone]           Bring a current list of meds and any records pertaining to this visit. For Physicals, please bring immunization records and any forms needing to be filled out. Please arrive 10 minutes early to complete paperwork.              Future tests that were ordered for you today     Open Future Orders        Priority Expected Expires Ordered    Urine Culture Aerobic Bacterial Routine 2017            Who to contact     If you have questions or need follow up information about today's clinic visit or your schedule please contact [facility] [place] directly at [phone].  Normal or non-critical lab and imaging results will be communicated to you by MyChart, letter or phone within 4 business days after the clinic has received the results. If you do not hear from us within 7 days,  please contact the clinic through iFollo or phone. If you have a critical or abnormal lab result, we will notify you by phone as soon as possible.  Submit refill requests through iFollo or call your pharmacy and they will forward the refill request to us. Please allow 3 business days for your refill to be completed.          Additional Information About Your Visit        [employer] Information     iFollo gives you secure access to your electronic health record. If you see a primary care provider, you can also send messages to your care team and make appointments. If you have questions, please call your primary care clinic.  If you do not have a primary care provider, please call [phone] and they will assist you.        Care EveryWhere ID     This is your Care EveryWhere ID. This could be used by other organizations to access your [place] medical records  [id number]        Your Vitals Were     Last Period Breastfeeding? BMI (Body Mass Index)             05/18/2017 (Exact Date) No 28.97 kg/m2          Blood Pressure from Last 3 Encounters:   11/21/17 120/68   11/02/17 108/70   10/06/17 112/62    Weight from Last 3 Encounters:   11/21/17 141 lb (64 kg)   11/02/17 139 lb (63 kg)   10/06/17 135 lb 12.8 oz (61.6 kg)               Primary Care Provider Office Phone # Fax #    [facility] [phone]       [place] [address]  [facility] [address]  [facility] [address]        Equal Access to Services     FLORENCE FREIRE : Hadii aad ku hadasho Soomaali, waaxda luqadaha, qaybta kaalmada adeegyada, selvin baig . So [facility] [phone].    ATENCIÓN: Si habla español, tiene a thompson disposición servicios gratuitos de asistencia lingüística. Llame al [phone].    We comply with applicable federal civil rights laws and [place] laws. We do not discriminate on the basis of race, color, national origin, age, disability, sex, sexual  orientation, or gender identity.            Thank you!     Thank you for choosing [facility] [place]  for your care. Our goal is always to provide you with excellent care. Hearing back from our patients is one way we can continue to improve our services. Please take a few minutes to complete the written survey that you may receive in the mail after your visit with us. Thank you!             Your Updated Medication List - Protect others around you: Learn how to safely use, store and throw away your medicines at www.disposemymeds.org.          This list is accurate as of: 11/21/17  9:50 AM.  Always use your most recent med list.                   Brand Name Dispense Instructions for use Diagnosis    aspirin 81 MG tablet     90 tablet    Take 1 tablet (81 mg) by mouth daily    Supervision of high-risk pregnancy of elderly multigravida       cephALEXin 500 MG capsule    KEFLEX    28 capsule    Take 1 capsule (500 mg) by mouth every 6 hours    Acute cystitis without hematuria       prenatal multivitamin plus iron 27-0.8 MG Tabs per tablet      Take 1 tablet by mouth daily

## 2017-11-21 NOTE — PROGRESS NOTES
Prenatal Visit  Rosmery has not been to the diabetic educator. Has not been checking her blood sugar. Only picked up antibiotics 3 days ago and has not yet completed her course yet. No OB complaints today.  We discussed making dietary changes to avoid hyperglycemia.   I discussed having physical activity after every meal. Rosmery and her  were encouraged to make the appointment with the diabetic educator for testing supplies and education.   She was asked to return in 1 week after meeting with the educator to touch base and review her blood sugars.  Will plan for repeat sonogram at 30 weeks to ensure normal fetal growth. Will also need to repeat this after 36 weeks.  TDAP and repeat urine culture at the next visit.  Maria Ines Delong MD

## 2017-12-04 ENCOUNTER — ALLIED HEALTH/NURSE VISIT (OUTPATIENT)
Dept: EDUCATION SERVICES | Facility: CLINIC | Age: 37
End: 2017-12-04
Attending: OBSTETRICS & GYNECOLOGY
Payer: COMMERCIAL

## 2017-12-04 DIAGNOSIS — O24.419 GESTATIONAL DIABETES: Primary | ICD-10-CM

## 2017-12-04 PROCEDURE — G0108 DIAB MANAGE TRN  PER INDIV: HCPCS | Performed by: NUTRITIONIST

## 2017-12-04 NOTE — PROGRESS NOTES
Diabetes Self-Management Training - Gestational Diabetes    SUBJECTIVE/OBJECTIVE:  Rosmery Garcia presents today for education related to gestational diabetes.  She is accompanied by spouse  Patient has an eight year old son, had GDM during that pregnancy.   Patient's emotional response to diabetes: expresses readiness to learn    Estimated Date of Delivery: Data Unavailable    Lab Results   Component Value Date    GLC 65 07/29/2009       History   Smoking Status     Never Smoker   Smokeless Tobacco     Never Used       Lifestyle and Health Behaviors:  Physical Activity: not assessed    Nutrition:  Patient eats 3 meals and 1 snacks per day.  Patient brought food records.   Traditional mexican diet, but uses good portion control.  Most meals include veg.    Socio/Economic considerations:  Support System: family    Health Beliefs and Attitudes:   Stage of Change: ACTION (Actively working towards change)    INTERVENTION:  Patient was instructed on One Touch Verio Flex meter and was able to provide an accurate return demonstration.     Educational topics covered today:  GDM diagnosis, pathophysiology, Risks and Complications of GDM, Means of controlling GDM, Using a Blood Glucose Monitor, Blood Glucose Goals, Logging and Interpreting Glucose Results, When to Call a Diabetes Educator or OB Provider, Healthy Eating During Pregnancy, Counting Carbohydrates, Meal Planning for GDM, and Physical Activity    Educational materials provided today:   Honey Understanding Gestational Diabetes  GDM Log Book  One Touch Verio Flex meter kit    Pt verbalized understanding of concepts discussed and recommendations provided today.     PLAN:  Check glucose 4 times daily, before breakfast and 1 hour after each meal.     Check Ketones daily for one week, if negative, reduce testing to once a week.     Physical activity recommended: walking daily as able.    Meal plan: 2-3 carbs at breakfast, 3-4 carbs at lunch, 3-4 carbs at  supper, 1-2 carbs at 3 snacks a day.  Follow consistent CHO meal plan, eat CHO and protein/fat at all meals/snacks.    Call/e-mail/MyChart message diabetes educator if 3 or more blood sugars are above the goal in 1 week or if ketones are positive.     Call/e-mail/MyChart message with questions/concerns.    FOLLOW-UP:  Follow up with diabetes educator in 1 week.    Time Spent: 60 minutes  Encounter type: Individual

## 2017-12-04 NOTE — MR AVS SNAPSHOT
After Visit Summary   12/4/2017    Rosmery Garcia    MRN: [id number]           Patient Information     Date Of Birth          1980        Visit Information        Provider Department      12/4/2017 8:30 AM Babita Quezada RD [facility] [place],  Diabetes Education        Today's Diagnoses     Gestational diabetes    -  1       Follow-ups after your visit        Your next 10 appointments already scheduled     Dec 07, 2017  8:30 AM CST   Office Visit with Maria Ines Delong MD   [facility] ([facility])    [address] [place] [address] [address]  [facility] [id number] [phone]           Bring a current list of meds and any records pertaining to this visit. For Physicals, please bring immunization records and any forms needing to be filled out. Please arrive 10 minutes early to complete paperwork.            Dec 12, 2017  8:30 AM CST   Office Visit with Babita Quezada RD   [facility] ([facility])    [address] [place] [address] [phone]           Bring a current list of meds and any records pertaining to this visit. For Physicals, please bring immunization records and any forms needing to be filled out. Please arrive 10 minutes early to complete paperwork.              Who to contact     If you have questions or need follow up information about today's clinic visit or your schedule please contact [facility] directly at [phone].  Normal or non-critical lab and imaging results will be communicated to you by MyChart, letter or phone within 4 business days after the clinic has received the results. If you do not hear from us within 7 days, please contact the clinic through MyChart or phone. If you have a critical or abnormal lab result, we will notify you by phone as soon as possible.  Submit refill requests  through [employer] or call your pharmacy and they will forward the refill request to us. Please allow 3 business days for your refill to be completed.          Additional Information About Your Visit        [employer] Information     [facility] gives you secure access to your electronic health record. If you see a primary care provider, you can also send messages to your care team and make appointments. If you have questions, please call your primary care clinic.  If you do not have a primary care provider, please call [phone] and they will assist you.        Care EveryWhere ID     This is your Care EveryWhere ID. This could be used by other organizations to access your [place] medical records  [id number]         Blood Pressure from Last 3 Encounters:   11/21/17 120/68   11/02/17 108/70   10/06/17 112/62    Weight from Last 3 Encounters:   11/21/17 64 kg (141 lb)   11/02/17 63 kg (139 lb)   10/06/17 61.6 kg (135 lb 12.8 oz)              We Performed the Following     DIABETES EDUCATION - Individual  []          Today's Medication Changes          These changes are accurate as of: 12/4/17 11:08 AM.  If you have any questions, ask your nurse or doctor.               Start taking these medicines.        Dose/Directions    blood glucose monitoring lancets   Used for:  Gestational diabetes        Use to test blood sugar 4 times daily or as directed.  Ok to substitute alternative if insurance prefers.   Quantity:  200 each   Refills:  6       blood glucose monitoring test strip   Commonly known as:  ONETOUCH VERIO IQ   Used for:  Gestational diabetes        Use to test blood sugars 4 times daily or as directed.   Quantity:  150 strip   Refills:  6            Where to get your medicines      These medications were sent to [facility]/pharmacy #[id number] [place], [place] - [address] [facility]  [address] [place] [address]     Phone:  [phone]     blood glucose monitoring lancets    blood glucose monitoring  test strip                Primary Care Provider Office Phone # Fax #    [facility] [phone]       [facility] [address]  [facility] [address]        Equal Access to Services     FLORENCE FREIRE : Hadii jefe ku hadpedroo Soomaali, waaxda luqadaha, qaybta kaalmada adeegyada, waxay idiin hayrichn caitlinpat ocasio jovanni walsh. So [facility] [phone].    ATENCIÓN: Si habla español, tiene a thompson disposición servicios gratuitos de asistencia lingüística. Bibame al [phone].    We comply with applicable federal civil rights laws and [place] laws. We do not discriminate on the basis of race, color, national origin, age, disability, sex, sexual orientation, or gender identity.            Thank you!     Thank you for choosing [facility],  DIABETES EDUCATION  for your care. Our goal is always to provide you with excellent care. Hearing back from our patients is one way we can continue to improve our services. Please take a few minutes to complete the written survey that you may receive in the mail after your visit with us. Thank you!             Your Updated Medication List - Protect others around you: Learn how to safely use, store and throw away your medicines at www.disposemymeds.org.          This list is accurate as of: 12/4/17 11:08 AM.  Always use your most recent med list.                   Brand Name Dispense Instructions for use Diagnosis    aspirin 81 MG tablet     90 tablet    Take 1 tablet (81 mg) by mouth daily    Supervision of high-risk pregnancy of elderly multigravida       blood glucose monitoring lancets     200 each    Use to test blood sugar 4 times daily or as directed.  Ok to substitute alternative if insurance prefers.    Gestational diabetes       blood glucose monitoring test strip    ONETOUCH VERIO IQ    150 strip    Use to test blood sugars 4 times daily or as directed.    Gestational diabetes       cephALEXin 500 MG capsule     KEFLEX    28 capsule    Take 1 capsule (500 mg) by mouth every 6 hours    Acute cystitis without hematuria       prenatal multivitamin plus iron 27-0.8 MG Tabs per tablet      Take 1 tablet by mouth daily

## 2017-12-07 ENCOUNTER — PRENATAL OFFICE VISIT (OUTPATIENT)
Dept: OBGYN | Facility: CLINIC | Age: 37
End: 2017-12-07
Payer: COMMERCIAL

## 2017-12-07 VITALS — SYSTOLIC BLOOD PRESSURE: 104 MMHG | BODY MASS INDEX: 29.17 KG/M2 | WEIGHT: 142 LBS | DIASTOLIC BLOOD PRESSURE: 60 MMHG

## 2017-12-07 DIAGNOSIS — O09.529 SUPERVISION OF HIGH-RISK PREGNANCY OF ELDERLY MULTIGRAVIDA: ICD-10-CM

## 2017-12-07 DIAGNOSIS — O09.291 HX OF PREECLAMPSIA, PRIOR PREGNANCY, CURRENTLY PREGNANT, FIRST TRIMESTER: ICD-10-CM

## 2017-12-07 DIAGNOSIS — N39.0 URINARY TRACT INFECTION WITHOUT HEMATURIA, SITE UNSPECIFIED: ICD-10-CM

## 2017-12-07 DIAGNOSIS — Z98.891 H/O CESAREAN SECTION: ICD-10-CM

## 2017-12-07 DIAGNOSIS — O24.410 DIET CONTROLLED GESTATIONAL DIABETES MELLITUS (GDM) IN THIRD TRIMESTER: Primary | ICD-10-CM

## 2017-12-07 DIAGNOSIS — Z3A.29 29 WEEKS GESTATION OF PREGNANCY: ICD-10-CM

## 2017-12-07 PROBLEM — O24.414 INSULIN CONTROLLED GESTATIONAL DIABETES MELLITUS (GDM) IN SECOND TRIMESTER: Status: RESOLVED | Noted: 2017-11-21 | Resolved: 2017-12-07

## 2017-12-07 PROCEDURE — 87086 URINE CULTURE/COLONY COUNT: CPT | Performed by: OBSTETRICS & GYNECOLOGY

## 2017-12-07 PROCEDURE — 87186 SC STD MICRODIL/AGAR DIL: CPT | Performed by: OBSTETRICS & GYNECOLOGY

## 2017-12-07 PROCEDURE — 99207 ZZC PRENATAL VISIT: CPT | Performed by: OBSTETRICS & GYNECOLOGY

## 2017-12-07 PROCEDURE — 87088 URINE BACTERIA CULTURE: CPT | Performed by: OBSTETRICS & GYNECOLOGY

## 2017-12-07 NOTE — MR AVS SNAPSHOT
After Visit Summary   2017    Rosmery Garcia    MRN: [id number]           Patient Information     Date Of Birth          1980        Visit Information        Provider Department      2017 8:30 AM Maria Ines Delong MD [facility]        Today's Diagnoses     Diet controlled gestational diabetes mellitus (GDM) in third trimester    -  1    Urinary tract infection without hematuria, site unspecified        H/O  section        Hx of preeclampsia, prior pregnancy, currently pregnant, first trimester        Supervision of high-risk pregnancy of elderly multigravida        29 weeks gestation of pregnancy           Follow-ups after your visit        Your next 10 appointments already scheduled     Dec 12, 2017  8:30 AM CST   Office Visit with Babita Quezada RD   [facility] [place],  Diabetes Education ([facility])    [address] [place] [address] [address]  [facility] [address] [phone]           Bring a current list of meds and any records pertaining to this visit. For Physicals, please bring immunization records and any forms needing to be filled out. Please arrive 10 minutes early to complete paperwork.            Dec 18, 2017  9:30 AM CST   US PELVIC COMPLETE W TRANSVAGINAL with WEUS1   [facility] ([facility])    [address] [place] [address] [address]  [facility] [address] [phone]           Please bring a list of your medicines (including vitamins, minerals and over-the-counter drugs). Also, tell your doctor about any allergies you may have. Wear comfortable clothes and leave your valuables at home.  Adults: Drink six 8-ounce glasses of fluid one hour before your exam. Do NOT empty your bladder.  If you need to empty your bladder before your exam, try to release only a little bit of urine. Then, drink another 8oz glass of fluid.  Children: Children who  are potty trained should drink at least 4 cups (32 oz) of liquid 45 minutes to one hour prior to the exam. The child s bladder must be full in order to achieve a diagnostic exam. If your child is very uncomfortable or has an urgent need to pee, please notify a technologist; they will try to find out how much longer the wait may be and provide instructions to help relieve the pressure. Occasionally it is medically necessary to insert a urinary catheter to fill the bladder.  Please call the Imaging Department at your exam site with any questions.            Dec 18, 2017 10:20 AM CST   ESTABLISHED PRENATAL with Maria Ines Delong MD   [facility] ([facility])    [address] [place] [address] [phone]              Future tests that were ordered for you today     Open Future Orders        Priority Expected Expires Ordered    US OB Ltd One Or More Fetus FU/Repeat Routine  12/7/2018 12/7/2017            Who to contact     If you have questions or need follow up information about today's clinic visit or your schedule please contact [facility] directly at [phone].  Normal or non-critical lab and imaging results will be communicated to you by MyChart, letter or phone within 4 business days after the clinic has received the results. If you do not hear from us within 7 days, please contact the clinic through [employer] or phone. If you have a critical or abnormal lab result, we will notify you by phone as soon as possible.  Submit refill requests through [employer] or call your pharmacy and they will forward the refill request to us. Please allow 3 business days for your refill to be completed.          Additional Information About Your Visit        [employer] Information     [employer] gives you secure access to your electronic health record. If you see a primary care provider, you can also send messages to your care team and make  appointments. If you have questions, please call your primary care clinic.  If you do not have a primary care provider, please call [phone] and they will assist you.        Care EveryWhere ID     This is your Care EveryWhere ID. This could be used by other organizations to access your [place] medical records  [id number]        Your Vitals Were     Last Period Breastfeeding? BMI (Body Mass Index)             05/18/2017 (Exact Date) Unknown 29.17 kg/m2          Blood Pressure from Last 3 Encounters:   12/07/17 104/60   11/21/17 120/68   11/02/17 108/70    Weight from Last 3 Encounters:   12/07/17 142 lb (64.4 kg)   11/21/17 141 lb (64 kg)   11/02/17 139 lb (63 kg)              We Performed the Following     Urine Culture Aerobic Bacterial        Primary Care Provider Office Phone # Fax #    [facility] [phone]       [address] [address]        Equal Access to Services     FLORENCE FREIRE : Hadii aad ku hadasho Soomaali, waaxda luqadaha, qaybta kaalmada adeegyada, selvin baig . So [facility] [phone].    ATENCIÓN: Si habla español, tiene a thompson disposición servicios gratuitos de asistencia lingüística. Llame al [phone].    We comply with applicable federal civil rights laws and [place] laws. We do not discriminate on the basis of race, color, national origin, age, disability, sex, sexual orientation, or gender identity.            Thank you!     Thank you for choosing [facility]  for your care. Our goal is always to provide you with excellent care. Hearing back from our patients is one way we can continue to improve our services. Please take a few minutes to complete the written survey that you may receive in the mail after your visit with us. Thank you!             Your Updated Medication List - Protect others around you: Learn how to safely use, store  and throw away your medicines at www.disposemymeds.org.          This list is accurate as of: 12/7/17  9:09 AM.  Always use your most recent med list.                   Brand Name Dispense Instructions for use Diagnosis    aspirin 81 MG tablet     90 tablet    Take 1 tablet (81 mg) by mouth daily    Supervision of high-risk pregnancy of elderly multigravida       blood glucose monitoring lancets     200 each    Use to test blood sugar 4 times daily or as directed.  Ok to substitute alternative if insurance prefers.    Gestational diabetes       blood glucose monitoring test strip    ONETOUCH VERIO IQ    150 strip    Use to test blood sugars 4 times daily or as directed.    Gestational diabetes       prenatal multivitamin plus iron 27-0.8 MG Tabs per tablet      Take 1 tablet by mouth daily

## 2017-12-07 NOTE — PROGRESS NOTES
Prenatal Visit--GDM  Doing well. Met with Diabetic Educator on . Has been checking blood sugars one hour PP and has been trying to limit her portions. Still continues to eat mostly Mexican food. Had to be on glyburide in her last pregnancy. Has never been on insulin.  is expressing concern about cost of testing strips.    -  Blood sugars:   Fasting 112-130 ( all abnormal)  Breakfast: 133-178 (1 abnormal)  Lunch: 105-159 (1 abnormal)  Dinner: 134-158(2 abnormal)    A/P  Prior C/S: still desires TOLAC  Persistent bacteruria: recheck urine culture today.  Hx of Pre-E: continue ASA, BP wnl  GDM: discussed with the patient new recommendation to consider insulin as first line treatment over glyburide. She used glyburide in her prior pregnancy. As she has only been checking her blood sugars for three days we will wait until next week to add on medication. Encouraged her to ambulate after each meal and to prolong accuchecks to 2 hour post prandial. I informed her that if we need medical management of her blood sugars we will start  testing which will increase her visits to at least once a week and potentially twice a week.  Growth ultrasound ordered for her next visit.    RTC in 1 week  Maria Ines Delong MD

## 2017-12-09 LAB
BACTERIA SPEC CULT: ABNORMAL
Lab: ABNORMAL
SPECIMEN SOURCE: ABNORMAL

## 2017-12-11 DIAGNOSIS — R82.71 ASYMPTOMATIC BACTERIURIA: Primary | ICD-10-CM

## 2017-12-11 DIAGNOSIS — O09.529 SUPERVISION OF HIGH-RISK PREGNANCY OF ELDERLY MULTIGRAVIDA: ICD-10-CM

## 2017-12-11 RX ORDER — NITROFURANTOIN 25; 75 MG/1; MG/1
100 CAPSULE ORAL AT BEDTIME
Qty: 90 CAPSULE | Refills: 1 | Status: SHIPPED | OUTPATIENT
Start: 2017-12-11 | End: 2018-01-18

## 2017-12-11 NOTE — PROGRESS NOTES
Patient called and informed of persistent bacteruria. Given persistence etiology could be stone or the urethral diverticulum/ vaginal cyst seen on exam. Will give macrobid daily for prophylaxis for the remainder of the pregnancy. Renal ultrasound ordered. Will defer referral for diverticulum removal until after delivery.

## 2017-12-12 ENCOUNTER — OFFICE VISIT (OUTPATIENT)
Dept: EDUCATION SERVICES | Facility: CLINIC | Age: 37
End: 2017-12-12
Attending: ADVANCED PRACTICE MIDWIFE
Payer: COMMERCIAL

## 2017-12-12 DIAGNOSIS — O24.410 DIET CONTROLLED GESTATIONAL DIABETES MELLITUS (GDM) IN THIRD TRIMESTER: Primary | ICD-10-CM

## 2017-12-12 DIAGNOSIS — O24.414 INSULIN CONTROLLED GESTATIONAL DIABETES MELLITUS (GDM) IN THIRD TRIMESTER: Primary | ICD-10-CM

## 2017-12-12 PROCEDURE — G0108 DIAB MANAGE TRN  PER INDIV: HCPCS | Performed by: NUTRITIONIST

## 2017-12-12 RX ORDER — GLUCOSAMINE HCL/CHONDROITIN SU 500-400 MG
CAPSULE ORAL
Qty: 100 EACH | Refills: 0 | Status: SHIPPED | OUTPATIENT
Start: 2017-12-12 | End: 2018-03-06

## 2017-12-12 NOTE — MR AVS SNAPSHOT
After Visit Summary   12/12/2017    Rosmery Garcia    MRN: [id number]           Patient Information     Date Of Birth          1980        Visit Information        Provider Department      12/12/2017 8:30 AM Babita Quezada RD [facility] [place],  Diabetes Education        Today's Diagnoses     Diet controlled gestational diabetes mellitus (GDM) in third trimester    -  1       Follow-ups after your visit        Your next 10 appointments already scheduled     Dec 18, 2017  9:30 AM CST   US PELVIC COMPLETE W TRANSVAGINAL with WEUS1   [facility] [place] ([facility] [facility])    [address] [place] [address]   [phone]           Please bring a list of your medicines (including vitamins, minerals and over-the-counter drugs). Also, tell your doctor about any allergies you may have. Wear comfortable clothes and leave your valuables at home.  Adults: Drink six 8-ounce glasses of fluid one hour before your exam. Do NOT empty your bladder.  If you need to empty your bladder before your exam, try to release only a little bit of urine. Then, drink another 8oz glass of fluid.  Children: Children who are potty trained should drink at least 4 cups (32 oz) of liquid 45 minutes to one hour prior to the exam. The child s bladder must be full in order to achieve a diagnostic exam. If your child is very uncomfortable or has an urgent need to pee, please notify a technologist; they will try to find out how much longer the wait may be and provide instructions to help relieve the pressure. Occasionally it is medically necessary to insert a urinary catheter to fill the bladder.  Please call the Imaging Department at your exam site with any questions.            Dec 18, 2017 10:20 AM CST   ESTABLISHED PRENATAL with Maria Ines Delong MD   [facility] [place] ([facility])    [address]  [address]   [phone]            Dec 19, 2017 12:00 PM CST   Telephone Visit with Bab[facility]zada [facility]   [facility]  [facility] ([facility])    [address] [place] [address] [phone]           Note: this is not an onsite visit; there is no need to come to the facility.              Future tests that were ordered for you today     Open Future Orders        Priority Expected Expires Ordered    US Renal Complete Routine  12/11/2018 12/11/2017            Who to contact     If you have questions or need follow up information about today's clinic visit or your schedule please contact [facility] directly at [phone].  Normal or non-critical lab and imaging results will be communicated to you by MyChart, letter or phone within 4 business days after the clinic has received the results. If you do not hear from us within 7 days, please contact the clinic through [employer] or phone. If you have a critical or abnormal lab result, we will notify you by phone as soon as possible.  Submit refill requests through [employer] or call your pharmacy and they will forward the refill request to us. Please allow 3 business days for your refill to be completed.          Additional Information About Your Visit        [employer] Information     [employer] gives you secure access to your electronic health record. If you see a primary care provider, you can also send messages to your care team and make appointments. If you have questions, please call your primary care clinic.  If you do not have a primary care provider, please call [phone] and they will assist you.        Care EveryWhere ID     This is your Care EveryWhere ID. This could be used by other organizations to access your [place] medical records  [id number]        Your Vitals Were     Last Period                   05/18/2017 (Exact Date)            Blood Pressure  from Last 3 Encounters:   12/07/17 104/60   11/21/17 120/68   11/02/17 108/70    Weight from Last 3 Encounters:   12/07/17 64.4 kg (142 lb)   11/21/17 64 kg (141 lb)   11/02/17 63 kg (139 lb)              We Performed the Following     DIABETES EDUCATION - Individual  []        Primary Care Provider Office Phone # Fax #    [facility] [phone]       [facility] [address]  [address] [facility] [address]  [facility] [address]        Equal Access to Services     FLORENCE FREIRE : Hadii aad ku hadasho Soomaali, waaxda luqadaha, qaybta kaalmada adeegyada, selvin pinedo haykelsea baig . So [facility] [phone].    ATENCIÓN: Si habla español, tiene a thompson disposición servicios gratuitos de asistencia lingüística. Ll[facility]ier Health [phone].    We comply with applicable federal civil rights laws and [place] laws. We do not discriminate on the basis of race, color, national origin, age, disability, sex, sexual orientation, or gender identity.            Thank you!     Thank you for choosing [facility],  DIABETES EDUCATION  for your care. Our goal is always to provide you with excellent care. Hearing back from our patients is one way we can continue to improve our services. Please take a few minutes to complete the written survey that you may receive in the mail after your visit with us. Thank you!             Your Updated Medication List - Protect others around you: Learn how to safely use, store and throw away your medicines at www.disposemymeds.org.          This list is accurate as of: 12/12/17 10:38 AM.  Always use your most recent med list.                   Brand Name Dispense Instructions for use Diagnosis    aspirin 81 MG tablet     90 tablet    Take 1 tablet (81 mg) by mouth daily    Supervision of high-risk pregnancy of elderly multigravida       blood glucose monitoring lancets     200 each    Use to test blood sugar 4 times daily or as directed.  Ok to  substitute alternative if insurance prefers.    Gestational diabetes       blood glucose monitoring test strip    ONETOUCH VERIO IQ    150 strip    Use to test blood sugars 4 times daily or as directed.    Gestational diabetes       nitroFURantoin (macrocrystal-monohydrate) 100 MG capsule    MACROBID    90 capsule    Take 1 capsule (100 mg) by mouth At Bedtime    Asymptomatic bacteriuria       prenatal multivitamin plus iron 27-0.8 MG Tabs per tablet      Take 1 tablet by mouth daily

## 2017-12-12 NOTE — PROGRESS NOTES
Gestational Diabetes Follow-up Visit    SUBJECTIVE/OBJECTIVE:  Rosmery Garcia presents today for education and evaluation of glucose control related to gestational diabetes  She is accompanied by spouse  29 1/2 weeks gestation    LMP 05/18/2017 (Exact Date)    Blood Glucose/Ketone Log:    Date  Fasting Post Breakfast Post Lunch Post Supper   12/4    116 152   12/5  112 178 159 158   12/6  126 133 105 134   12/7  130 179 111 157   12/8  110 159 161 171   12/9  89 139 125 131   12/10  95 154 116 130   12/11  107 97 two hour 91 two hour 110 two hour   12/12  117        Current gestational diabetes management:    Taking medications for gestational diabetes? No but patient will need medication during this pregnancy according to glucose results    Physical Activity: she is on her feet at her full time job    Nutrition:  Patient eats 3 meals and 3 snacks per day, is following recommended meal plan but is also sometimes over restricting her carbohydrate intake to try to control her glucose.    ASSESSMENT:  Glucose consistently running above goal.  Recommend starting Humulin N (NPH) Insulin.    Health Beliefs and Attitudes:   Stage of Change: ACTION (Actively working towards change)    INTERVENTION:  Educational topics covered today:  Reviewed Meal Planning  Insulin - how to use the Kwikpen, injection, storage, function, when to take. Patient demonstrated understanding today by injecting into a foam pillow.  Hypoglycemia symptoms, causes and treatment and when to contact the medical team    Educational Materials provided today:  Kwikpen Humulin N (NPH) instructions handout    PLAN:  Check glucose 4 times daily. Please stick with fasting and one hour after each meal daily.   Continue to follow recommended meal plan.  Recommend starting Humulin N (NPH) insulin before bed. Message sent to patient's OB/GYN to see if she agrees and to order the medication.  Patient was given a voucher for the insulin.      FOLLOW-UP:  Telephone visit scheduled on Tuesday 12/19.    Time spent was 60 minutes  Encounter type: Individual    Any diabetes medication dose changes were made via the CDE Protocol and Collaborative Practice Agreement with the patient's OB/GYN. A copy of this encounter was shared with the provider.

## 2017-12-18 ENCOUNTER — PRENATAL OFFICE VISIT (OUTPATIENT)
Dept: OBGYN | Facility: CLINIC | Age: 37
End: 2017-12-18
Payer: COMMERCIAL

## 2017-12-18 ENCOUNTER — RADIANT APPOINTMENT (OUTPATIENT)
Dept: ULTRASOUND IMAGING | Facility: CLINIC | Age: 37
End: 2017-12-18
Payer: COMMERCIAL

## 2017-12-18 VITALS — WEIGHT: 142 LBS | DIASTOLIC BLOOD PRESSURE: 52 MMHG | SYSTOLIC BLOOD PRESSURE: 98 MMHG | BODY MASS INDEX: 29.17 KG/M2

## 2017-12-18 DIAGNOSIS — O09.291 HX OF PREECLAMPSIA, PRIOR PREGNANCY, CURRENTLY PREGNANT, FIRST TRIMESTER: ICD-10-CM

## 2017-12-18 DIAGNOSIS — Z3A.30 30 WEEKS GESTATION OF PREGNANCY: ICD-10-CM

## 2017-12-18 DIAGNOSIS — O24.410 DIET CONTROLLED GESTATIONAL DIABETES MELLITUS (GDM) IN THIRD TRIMESTER: ICD-10-CM

## 2017-12-18 DIAGNOSIS — Z98.891 H/O CESAREAN SECTION: ICD-10-CM

## 2017-12-18 DIAGNOSIS — O24.414 INSULIN CONTROLLED GESTATIONAL DIABETES MELLITUS (GDM) IN THIRD TRIMESTER: Primary | ICD-10-CM

## 2017-12-18 PROCEDURE — 76816 OB US FOLLOW-UP PER FETUS: CPT | Performed by: OBSTETRICS & GYNECOLOGY

## 2017-12-18 PROCEDURE — 99207 ZZC PRENATAL VISIT: CPT | Performed by: OBSTETRICS & GYNECOLOGY

## 2017-12-18 NOTE — PROGRESS NOTES
Prenatal Visit  Was started on 5 units of NPH last Thursday. States that she has been trying to pay attention to her diet but her elevated blood sugars are associated with larger portions.   She has only been on insulin for 5 days.  She denies any vaginal bleeding.    Blood Sugars:  Fasting  (2/5) abnormal  B: 130-167 (2/5) abnormal  L: 105-168 (2/4) abnormal  D 133-180 (3/4) abnormal     Discussed that we need to increase her night time insulin. Will await recs from DM educator.   Will likely also need to add on night time short acting insulin as well. EFW 59%tile today    Will continue with weekly visits with weekly BPPs    Macrobid for suppression    ASA for hx of PreE    Discussed that delivery will be recommended at 39 weeks. Discussed that if induction is needed, this will increase the risk of needing another  section. Will discuss further at 38 weeks based on her cervical exam.  RTC in 1 week  Maria Ines Delong MD

## 2017-12-18 NOTE — MR AVS SNAPSHOT
After Visit Summary   2017    Rosmery Garcia    MRN: [id number]           Patient Information     Date Of Birth          1980        Visit Information        Provider Department      2017 10:20 AM Maria Ines Delong MD [facility]        Today's Diagnoses     Insulin controlled gestational diabetes mellitus (GDM) in third trimester    -  1    H/O  section        Hx of preeclampsia, prior pregnancy, currently pregnant, first trimester        30 weeks gestation of pregnancy           Follow-ups after your visit        Your next 10 appointments already scheduled     Dec 19, 2017 12:00 PM CST   Telephone Visit with Babita Quezada RD   [facility] [place],  Diabetes Education ([facility], [facility])    [address] [place] [address]  [facility] [id number] [phone]           Note: this is not an onsite visit; there is no need to come to the facility.            Dec 28, 2017  9:50 AM CST   ESTABLISHED PRENATAL with Maria Ines Delong MD   [facility] ([facility])    [address] [place] [address] 100  [facility] [address] [phone]              Future tests that were ordered for you today     Open Future Orders        Priority Expected Expires Ordered    US Fetal Biophys Prof w/o Non Stress Test Routine 2017            Who to contact     If you have questions or need follow up information about today's clinic visit or your schedule please contact [facility] directly at [phone].  Normal or non-critical lab and imaging results will be communicated to you by MyChart, letter or phone within 4 business days after the clinic has received the results. If you do not hear from us within 7 days, please contact the clinic through MyChart or phone. If you have a critical or abnormal lab result, we will notify  you by phone as soon as possible.  Submit refill requests through [employer] or call your pharmacy and they will forward the refill request to us. Please allow 3 business days for your refill to be completed.          Additional Information About Your Visit        Si TVhart Information     [employer] gives you secure access to your electronic health record. If you see a primary care provider, you can also send messages to your care team and make appointments. If you have questions, please call your primary care clinic.  If you do not have a primary care provider, please call [phone] and they will assist you.        Care EveryWhere ID     This is your Care EveryWhere ID. This could be used by other organizations to access your [facility] medical records  [id number]        Your Vitals Were     Last Period BMI (Body Mass Index)                05/18/2017 (Exact Date) 29.17 kg/m2           Blood Pressure from Last 3 Encounters:   12/18/17 98/52   12/07/17 104/60   11/21/17 120/68    Weight from Last 3 Encounters:   12/18/17 142 lb (64.4 kg)   12/07/17 142 lb (64.4 kg)   11/21/17 141 lb (64 kg)               Primary Care Provider Office Phone # Fax #    [facility] [phone]       [place] [address] [address] [address]        Equal Access to Services     FLORENCE FREIRE AH: Hadii aad ku hadasho Soomaali, waaxda luqadaha, qaybta kaalmada adestefan, selvin walsh. So [facility] [phone].    ATENCIÓN: Si habla español, tiene a thompson disposición servicios gratuitos de asistencia lingüística. Jamia al [phone].    We comply with applicable federal civil rights laws and [place] laws. We do not discriminate on the basis of race, color, national origin, age, disability, sex, sexual orientation, or gender identity.            Thank you!     Thank you for choosing [facility]  for your care. Our goal is  always to provide you with excellent care. Hearing back from our patients is one way we can continue to improve our services. Please take a few minutes to complete the written survey that you may receive in the mail after your visit with us. Thank you!             Your Updated Medication List - Protect others around you: Learn how to safely use, store and throw away your medicines at www.disposemymeds.org.          This list is accurate as of: 12/18/17 10:53 AM.  Always use your most recent med list.                   Brand Name Dispense Instructions for use Diagnosis    alcohol swab prep pads     100 each    Use to swab area of injection/lamar as directed.    Insulin controlled gestational diabetes mellitus (GDM) in third trimester       aspirin 81 MG tablet     90 tablet    Take 1 tablet (81 mg) by mouth daily    Supervision of high-risk pregnancy of elderly multigravida       blood glucose monitoring lancets     200 each    Use to test blood sugar 4 times daily or as directed.  Ok to substitute alternative if insurance prefers.    Gestational diabetes       blood glucose monitoring test strip    ONETOUCH VERIO IQ    150 strip    Use to test blood sugars 4 times daily or as directed.    Gestational diabetes       insulin isophane human 100 UNIT/ML injection    HumuLIN N PEN    3 mL    5 units before bed    Insulin controlled gestational diabetes mellitus (GDM) in third trimester       insulin pen needle 32G X 4 MM    ULTICARE MICRO    100 each    Use 1 pen needles daily or as directed.    Insulin controlled gestational diabetes mellitus (GDM) in third trimester       nitroFURantoin (macrocrystal-monohydrate) 100 MG capsule    MACROBID    90 capsule    Take 1 capsule (100 mg) by mouth At Bedtime    Asymptomatic bacteriuria       prenatal multivitamin plus iron 27-0.8 MG Tabs per tablet      Take 1 tablet by mouth daily

## 2017-12-19 ENCOUNTER — VIRTUAL VISIT (OUTPATIENT)
Dept: EDUCATION SERVICES | Facility: CLINIC | Age: 37
End: 2017-12-19
Attending: OBSTETRICS & GYNECOLOGY
Payer: COMMERCIAL

## 2017-12-19 DIAGNOSIS — O24.414 INSULIN CONTROLLED GESTATIONAL DIABETES MELLITUS (GDM) IN THIRD TRIMESTER: Primary | ICD-10-CM

## 2017-12-19 NOTE — PROGRESS NOTES
Phone call made to patient to review glucose readings and insulin adjustment.    Patient started NPH insulin 5 units on the night of 12/14.    Her readings since I last saw her are as follows:  In this order - Fasting, 1 hour post BF, 1 hour post Lunch, 1 hour post Dinner  12/13 - 99, 163, 149, 120  12/14 - 111, 133, 168, 164  12/15 - 95, 165, 105, 133  12/16 - 89, 130, 121, 162  12/17 - 82, 131, 149, 160  12/18 - 116, 167, 101, 132  12/19 - 114, 154    Recommend increase insulin to 8 units tonight.  Review glucose again via phone on Friday, apt scheduled with me.  Note routed to Dr. Maria Ines Quezada, CECI, LD, CDE  12/19/2017   12:54 PM

## 2017-12-19 NOTE — MR AVS SNAPSHOT
After Visit Summary   12/19/2017    Rosmery Garcia    MRN: [id number]           Patient Information     Date Of Birth          1980        Visit Information        Provider Department      12/19/2017 12:00 PM Babita Quezada RD [facility], [place],  Diabetes Education        Today's Diagnoses     Insulin controlled gestational diabetes mellitus (GDM) in third trimester    -  1       Follow-ups after your visit        Your next 10 appointments already scheduled     Dec 22, 2017 12:00 PM CST   Telephone Visit with Babita Quezada RD   [facility]  Diabetes Education ([facility])    [address] [place] [address]  [facility] [id number] [phone]           Note: this is not an onsite visit; there is no need to come to the facility.            Dec 28, 2017  9:50 AM CST   ESTABLISHED PRENATAL with Maria Ines Delong MD   [facility] ([facility])    [address] [place] [address] 100  [facility] [address] [phone]              Future tests that were ordered for you today     Open Future Orders        Priority Expected Expires Ordered    US Fetal Biophys Prof w/o Non Stress Test Routine 12/26/2017 12/18/2018 12/18/2017            Who to contact     If you have questions or need follow up information about today's clinic visit or your schedule please contact [facility]  [facility]ABETES EDUCATION directly at [phone].  Normal or non-critical lab and imaging results will be communicated to you by MyChart, letter or phone within 4 business days after the clinic has received the results. If you do not hear from us within 7 days, please contact the clinic through MyChart or phone. If you have a critical or abnormal lab result, we will notify you by phone as soon as possible.  Submit refill requests through [employer] or call your pharmacy and they will forward the refill request to us. Please  allow 3 business days for your refill to be completed.          Additional Information About Your Visit        MyChart Information     nGAPhart gives you secure access to your electronic health record. If you see a primary care provider, you can also send messages to your care team and make appointments. If you have questions, please call your primary care clinic.  If you do not have a primary care provider, please call [phone] and they will assist you.        Care EveryWhere ID     This is your Care EveryWhere ID. This could be used by other organizations to access your [facility] medical records  [id number]        Your Vitals Were     Last Period                   05/18/2017 (Exact Date)            Blood Pressure from Last 3 Encounters:   12/18/17 98/52   12/07/17 104/60   11/21/17 120/68    Weight from Last 3 Encounters:   12/18/17 64.4 kg (142 lb)   12/07/17 64.4 kg (142 lb)   11/21/17 64 kg (141 lb)              Today, you had the following     No orders found for display       Primary Care Provider Office Phone # Fax #    [facility] [phone]       [facility] [address] [place] [address] [facility]  [facility] [address]        Equal Access to Services     FLORENCE FREIRE : Hadii aad ku hadasho Soomaali, waaxda luqadaha, qaybta kaalmada adeegyada, waxay idiin hayrichn alexia walsh. So [facility] [phone].    ATENCIÓN: Si habla español, tiene a thompson disposición servicios gratuitos de asistencia lingüística. Llame al [phone].    We comply with applicable federal civil rights laws and [place] laws. We do not discriminate on the basis of race, color, national origin, age, disability, sex, sexual orientation, or gender identity.            Thank you!     Thank you for choosing [facility],  DIABETES EDUCATION  for your care. Our goal is always to provide you with excellent care. Hearing back from our patients is one way we can continue to improve  our services. Please take a few minutes to complete the written survey that you may receive in the mail after your visit with us. Thank you!             Your Updated Medication List - Protect others around you: Learn how to safely use, store and throw away your medicines at www.disposemymeds.org.          This list is accurate as of: 12/19/17 12:54 PM.  Always use your most recent med list.                   Brand Name Dispense Instructions for use Diagnosis    alcohol swab prep pads     100 each    Use to swab area of injection/lamar as directed.    Insulin controlled gestational diabetes mellitus (GDM) in third trimester       aspirin 81 MG tablet     90 tablet    Take 1 tablet (81 mg) by mouth daily    Supervision of high-risk pregnancy of elderly multigravida       blood glucose monitoring lancets     200 each    Use to test blood sugar 4 times daily or as directed.  Ok to substitute alternative if insurance prefers.    Gestational diabetes       blood glucose monitoring test strip    ONETOUCH VERIO IQ    150 strip    Use to test blood sugars 4 times daily or as directed.    Gestational diabetes       insulin isophane human 100 UNIT/ML injection    HumuLIN N PEN    3 mL    5 units before bed    Insulin controlled gestational diabetes mellitus (GDM) in third trimester       insulin pen needle 32G X 4 MM    ULTICARE MICRO    100 each    Use 1 pen needles daily or as directed.    Insulin controlled gestational diabetes mellitus (GDM) in third trimester       nitroFURantoin (macrocrystal-monohydrate) 100 MG capsule    MACROBID    90 capsule    Take 1 capsule (100 mg) by mouth At Bedtime    Asymptomatic bacteriuria       prenatal multivitamin plus iron 27-0.8 MG Tabs per tablet      Take 1 tablet by mouth daily

## 2017-12-22 ENCOUNTER — VIRTUAL VISIT (OUTPATIENT)
Dept: EDUCATION SERVICES | Facility: CLINIC | Age: 37
End: 2017-12-22
Attending: OBSTETRICS & GYNECOLOGY
Payer: COMMERCIAL

## 2017-12-22 DIAGNOSIS — O24.414 INSULIN CONTROLLED GESTATIONAL DIABETES MELLITUS (GDM) IN THIRD TRIMESTER: Primary | ICD-10-CM

## 2017-12-22 NOTE — PROGRESS NOTES
Glucose readings:  12/19 113, 154, 136, 146  Increased NPH to 8 units that evening  12/20 117 fasting, 184 one hour after eating, 120 one hour after lunch, 133 one hour after dinner  12/20 88 fasting, 126, 153, 140  12/21 117 fasting, 130 after breakfast     Recommend increase NPH insulin to 10 units tonight.  Phone follow up scheduled on 12/26.  Patient has in person follow up with her OB on 12/28.    Babita Quezada RD, LD, CDE  12/22/2017   12:28 PM

## 2017-12-22 NOTE — MR AVS SNAPSHOT
After Visit Summary   12/22/2017    Rosmery Garcia    MRN: [id number]           Patient Information     Date Of Birth          1980        Visit Information        Provider Department      12/22/2017 12:00 PM Babita Quezada RD [facility], [place],  Diabetes Education        Today's Diagnoses     Insulin controlled gestational diabetes mellitus (GDM) in third trimester    -  1       Follow-ups after your visit        Your next 10 appointments already scheduled     Dec 26, 2017 12:00 PM CST   Telephone Visit with Babita Quezada RD   [facility]  [facility] ([facility])    [address] [place] [address]  [facility] [id number] [phone]           Note: this is not an onsite visit; there is no need to come to the facility.            Dec 28, 2017  9:50 AM CST   ESTABLISHED PRENATAL with Maria Ines Delong MD   [facility] [place] ([facility])    [address] [place] [address] 100  [facility] [id number] [phone]              Who to contact     If you have questions or need follow up information about today's clinic visit or your schedule please contact [facility]  [facility]UC[facility] directly at [phone].  Normal or non-critical lab and imaging results will be communicated to you by PlanHQhart, letter or phone within 4 business days after the clinic has received the results. If you do not hear from us within 7 days, please contact the clinic through PlanHQhart or phone. If you have a critical or abnormal lab result, we will notify you by phone as soon as possible.  Submit refill requests through [employer] or call your pharmacy and they will forward the refill request to us. Please allow 3 business days for your refill to be completed.          Additional Information About Your Visit        PlanHQharNandi Proteins Information     [employer] gives you secure access to your electronic health record. If  you see a primary care provider, you can also send messages to your care team and make appointments. If you have questions, please call your primary care clinic.  If you do not have a primary care provider, please call [phone] and they will assist you.        Care EveryWhere ID     This is your Care EveryWhere ID. This could be used by other organizations to access your [facility] medical records  [id number]        Your Vitals Were     Last Period                   05/18/2017 (Exact Date)            Blood Pressure from Last 3 Encounters:   12/18/17 98/52   12/07/17 104/60   11/21/17 120/68    Weight from Last 3 Encounters:   12/18/17 64.4 kg (142 lb)   12/07/17 64.4 kg (142 lb)   11/21/17 64 kg (141 lb)              Today, you had the following     No orders found for display       Primary Care Provider Office Phone # Fax #    [facility] [phone]       [place] [address]  [facility] 100  [facility] [address]        Equal Access to Services     RANJAN FREIRE : Hadii aad ku hadasho Soomaali, waaxda luqadaha, qaybta kaalmada adeegyabri, selvin baig . So [facility] [phone].    ATENCIÓN: Si habla español, tiene a thompson disposición servicios gratuitos de asistencia lingüística. Jamia al [phone].    We comply with applicable federal civil rights laws and [place] laws. We do not discriminate on the basis of race, color, national origin, age, disability, sex, sexual orientation, or gender identity.            Thank you!     Thank you for choosing [facility]  DIABETES EDUCATION  for your care. Our goal is always to provide you with excellent care. Hearing back from our patients is one way we can continue to improve our services. Please take a few minutes to complete the written survey that you may receive in the mail after your visit with us. Thank you!             Your Updated Medication List - Protect others  around you: Learn how to safely use, store and throw away your medicines at www.disposemymeds.org.          This list is accurate as of: 12/22/17 12:29 PM.  Always use your most recent med list.                   Brand Name Dispense Instructions for use Diagnosis    alcohol swab prep pads     100 each    Use to swab area of injection/lamar as directed.    Insulin controlled gestational diabetes mellitus (GDM) in third trimester       aspirin 81 MG tablet     90 tablet    Take 1 tablet (81 mg) by mouth daily    Supervision of high-risk pregnancy of elderly multigravida       blood glucose monitoring lancets     200 each    Use to test blood sugar 4 times daily or as directed.  Ok to substitute alternative if insurance prefers.    Gestational diabetes       blood glucose monitoring test strip    ONETOUCH VERIO IQ    150 strip    Use to test blood sugars 4 times daily or as directed.    Gestational diabetes       insulin isophane human 100 UNIT/ML injection    HumuLIN N PEN    3 mL    5 units before bed    Insulin controlled gestational diabetes mellitus (GDM) in third trimester       insulin pen needle 32G X 4 MM    ULTICARE MICRO    100 each    Use 1 pen needles daily or as directed.    Insulin controlled gestational diabetes mellitus (GDM) in third trimester       nitroFURantoin (macrocrystal-monohydrate) 100 MG capsule    MACROBID    90 capsule    Take 1 capsule (100 mg) by mouth At Bedtime    Asymptomatic bacteriuria       prenatal multivitamin plus iron 27-0.8 MG Tabs per tablet      Take 1 tablet by mouth daily

## 2017-12-26 ENCOUNTER — VIRTUAL VISIT (OUTPATIENT)
Dept: EDUCATION SERVICES | Facility: CLINIC | Age: 37
End: 2017-12-26
Attending: OBSTETRICS & GYNECOLOGY
Payer: COMMERCIAL

## 2017-12-26 DIAGNOSIS — O24.414 INSULIN CONTROLLED GESTATIONAL DIABETES MELLITUS (GDM) IN THIRD TRIMESTER: Primary | ICD-10-CM

## 2017-12-26 NOTE — MR AVS SNAPSHOT
After Visit Summary   12/26/2017    Rosmery Garcia    MRN: [id number]           Patient Information     Date Of Birth          1980        Visit Information        Provider Department      12/26/2017 12:00 PM Babita Quezada RD [facility],  Diabetes Education        Today's Diagnoses     Insulin controlled gestational diabetes mellitus (GDM) in third trimester    -  1       Follow-ups after your visit        Your next 10 appointments already scheduled     Dec 28, 2017  9:50 AM CST   ESTABLISHED PRENATAL with Maria Ines Delong MD   [facility] ([facility])    [address] [place] [address] 100  [facility] [address]   [phone]            Jan 05, 2018 12:00 PM CST   Telephone Visit with Babita Quezada RD   [facility],  Diabetes Education ([facility])    [address] [place] [address]  [facility] [id number] [phone]           Note: this is not an onsite visit; there is no need to come to the facility.              Who to contact     If you have questions or need follow up information about today's clinic visit or your schedule please contact [facility],  DIABETES EDUCATION directly at [phone].  Normal or non-critical lab and imaging results will be communicated to you by BookBottleshart, letter or phone within 4 business days after the clinic has received the results. If you do not hear from us within 7 days, please contact the clinic through BookBottleshart or phone. If you have a critical or abnormal lab result, we will notify you by phone as soon as possible.  Submit refill requests through SeeJay or call your pharmacy and they will forward the refill request to us. Please allow 3 business days for your refill to be completed.          Additional Information About Your Visit        BookBottlesharThird Brigade Information     SeeJay gives you secure access to your electronic health record. If  you see a primary care provider, you can also send messages to your care team and make appointments. If you have questions, please call your primary care clinic.  If you do not have a primary care provider, please call [phone] and they will assist you.        Care EveryWhere ID     This is your Care EveryWhere ID. This could be used by other organizations to access your [place] medical records  [id number]        Your Vitals Were     Last Period                   05/18/2017 (Exact Date)            Blood Pressure from Last 3 Encounters:   12/18/17 98/52   12/07/17 104/60   11/21/17 120/68    Weight from Last 3 Encounters:   12/18/17 64.4 kg (142 lb)   12/07/17 64.4 kg (142 lb)   11/21/17 64 kg (141 lb)              Today, you had the following     No orders found for display       Primary Care Provider Office Phone # Fax #    [facility] [phone]       [place] [address]  [facility] 100  [facility] [address]        Equal Access to Services     RANJAN FREIRE : Hadii aad ku hadasho Soomaali, waaxda luqadaha, qaybta kaalmada adeegyabri, selvin baig . So [facility] [phone].    ATENCIÓN: Si habla español, tiene a thompson disposición servicios gratuitos de asistencia lingüística. Jamia al [phone].    We comply with applicable federal civil rights laws and [place] laws. We do not discriminate on the basis of race, color, national origin, age, disability, sex, sexual orientation, or gender identity.            Thank you!     Thank you for choosing [facility]  DIABETES EDUCATION  for your care. Our goal is always to provide you with excellent care. Hearing back from our patients is one way we can continue to improve our services. Please take a few minutes to complete the written survey that you may receive in the mail after your visit with us. Thank you!             Your Updated Medication List - Protect others  around you: Learn how to safely use, store and throw away your medicines at www.disposemymeds.org.          This list is accurate as of: 12/26/17 12:12 PM.  Always use your most recent med list.                   Brand Name Dispense Instructions for use Diagnosis    alcohol swab prep pads     100 each    Use to swab area of injection/lamar as directed.    Insulin controlled gestational diabetes mellitus (GDM) in third trimester       aspirin 81 MG tablet     90 tablet    Take 1 tablet (81 mg) by mouth daily    Supervision of high-risk pregnancy of elderly multigravida       blood glucose monitoring lancets     200 each    Use to test blood sugar 4 times daily or as directed.  Ok to substitute alternative if insurance prefers.    Gestational diabetes       blood glucose monitoring test strip    ONETOUCH VERIO IQ    150 strip    Use to test blood sugars 4 times daily or as directed.    Gestational diabetes       insulin isophane human 100 UNIT/ML injection    HumuLIN N PEN    3 mL    5 units before bed    Insulin controlled gestational diabetes mellitus (GDM) in third trimester       insulin pen needle 32G X 4 MM    ULTICARE MICRO    100 each    Use 1 pen needles daily or as directed.    Insulin controlled gestational diabetes mellitus (GDM) in third trimester       nitroFURantoin (macrocrystal-monohydrate) 100 MG capsule    MACROBID    90 capsule    Take 1 capsule (100 mg) by mouth At Bedtime    Asymptomatic bacteriuria       prenatal multivitamin plus iron 27-0.8 MG Tabs per tablet      Take 1 tablet by mouth daily

## 2017-12-26 NOTE — PROGRESS NOTES
Call to patient to review glucose.  Glucose readings:  12/22 fasting 117, 130 one hour after breakfast, 142 one hour after lunch, 132 one hour after dinner  12/23 - 84, 113, 124, 123  12/24 - 82, 110, 131, 140  12/25 - 84, 140, 137, 142  12/26 - 115, 140  She is taking 10 units NPH insulin at bedtime.   No changes recommended at this time.  Patient has follow up with OB on Thursday.  Follow up with me via phone next week.  Babita Quezada, CECI, LD, CDE  12/26/2017   12:11 PM

## 2017-12-28 ENCOUNTER — MEDICAL CORRESPONDENCE (OUTPATIENT)
Dept: HEALTH INFORMATION MANAGEMENT | Facility: CLINIC | Age: 37
End: 2017-12-28

## 2017-12-28 ENCOUNTER — TRANSFERRED RECORDS (OUTPATIENT)
Dept: HEALTH INFORMATION MANAGEMENT | Facility: CLINIC | Age: 37
End: 2017-12-28

## 2017-12-28 ENCOUNTER — PRENATAL OFFICE VISIT (OUTPATIENT)
Dept: OBGYN | Facility: CLINIC | Age: 37
End: 2017-12-28
Payer: COMMERCIAL

## 2017-12-28 VITALS — BODY MASS INDEX: 29.99 KG/M2 | DIASTOLIC BLOOD PRESSURE: 74 MMHG | SYSTOLIC BLOOD PRESSURE: 112 MMHG | WEIGHT: 146 LBS

## 2017-12-28 DIAGNOSIS — O09.529 SUPERVISION OF HIGH-RISK PREGNANCY OF ELDERLY MULTIGRAVIDA: ICD-10-CM

## 2017-12-28 DIAGNOSIS — O24.414 INSULIN CONTROLLED GESTATIONAL DIABETES MELLITUS (GDM) IN THIRD TRIMESTER: Primary | ICD-10-CM

## 2017-12-28 DIAGNOSIS — O09.299 HISTORY OF GESTATIONAL DIABETES IN PRIOR PREGNANCY, CURRENTLY PREGNANT: ICD-10-CM

## 2017-12-28 DIAGNOSIS — N89.8 VAGINAL INCLUSION CYST: ICD-10-CM

## 2017-12-28 DIAGNOSIS — Z86.32 HISTORY OF GESTATIONAL DIABETES IN PRIOR PREGNANCY, CURRENTLY PREGNANT: ICD-10-CM

## 2017-12-28 PROCEDURE — 99207 ZZC PRENATAL VISIT: CPT | Performed by: OBSTETRICS & GYNECOLOGY

## 2017-12-28 NOTE — PROGRESS NOTES
Prenatal Visit.  Gestational Diabetes on Insulin  Rosmery is doing well. Reports good fetal movement. Denies contractions. Has been watching her portion sizes and what she eats as well. Her blood sugars are as below:   fasting 117, 130 one hour after breakfast, 142 one hour after lunch, 132 one hour after dinner   - 84, 113, 124, 123   - 82, 110, 131, 140   - 84, 140, 137, 142   - 115, 140 119  108  -- 105 160  129 141   -- 82, 149    NPH 10 units before bedtime.    Imaging at [facility]: Breech presentation, PITA 14. EFW 40%tile    A/P  GDM on Insulin: discussed need to increase NPH to 12 units at night time. If all of her fastings are wnl would recommend adding on meal time aspart to control post prandial blood sugars. Will continue weekly  testing with BPPs. Will repeat growth sonogram at 36 weeks  Hx of Pre E: continue low dose ASA  Hx of C/S: still interested in TOLAC. Will depend on fetal presentation and EFW close to term.  Bacteruria: continue nightly macrobid    RTC in 1 week    Maria Ines Delong MD

## 2017-12-28 NOTE — MR AVS SNAPSHOT
After Visit Summary   12/28/2017    Rosmery Garcia    MRN: [id number]           Patient Information     Date Of Birth          1980        Visit Information        Provider Department      12/28/2017 9:50 AM Maria Ines Delong MD [facility]        Today's Diagnoses     Insulin controlled gestational diabetes mellitus (GDM) in third trimester    -  1    Vaginal inclusion cyst        Supervision of high-risk pregnancy of elderly multigravida        History of gestational diabetes in prior pregnancy, currently pregnant           Follow-ups after your visit        Your next 10 appointments already scheduled     Jan 04, 2018 11:10 AM CST   ESTABLISHED PRENATAL with Maria Ines Delong MD   [facility] ([facility])    [address] [place] [address] 100  [facility] [address] [phone]            Jan 05, 2018 12:00 PM CST   Telephone Visit with Babita Quezada RD   [facility] [place],  Diabetes Education ([facility])    [address] [place] [address] [phone]           Note: this is not an onsite visit; there is no need to come to the facility.              Future tests that were ordered for you today     Open Future Orders        Priority Expected Expires Ordered    US OB Limited One Or More Fetuses Routine  12/28/2018 12/28/2017            Who to contact     If you have questions or need follow up information about today's clinic visit or your schedule please contact [facility] directly at [phone].  Normal or non-critical lab and imaging results will be communicated to you by MyChart, letter or phone within 4 business days after the clinic has received the results. If you do not hear from us within 7 days, please contact the clinic through MyChart or phone. If you have a critical or abnormal lab result, we  will notify you by phone as soon as possible.  Submit refill requests through Entrecard or call your pharmacy and they will forward the refill request to us. Please allow 3 business days for your refill to be completed.          Additional Information About Your Visit        "[employer]hart Information     Entrecard gives you secure access to your electronic health record. If you see a primary care provider, you can also send messages to your care team and make appointments. If you have questions, please call your primary care clinic.  If you do not have a primary care provider, please call [phone] and they will assist you.        Care EveryWhere ID     This is your Care EveryWhere ID. This could be used by other organizations to access your [place] medical records  [id number]        Your Vitals Were     Last Period BMI (Body Mass Index)                05/18/2017 (Exact Date) 29.99 kg/m2           Blood Pressure from Last 3 Encounters:   12/28/17 112/74   12/18/17 98/52   12/07/17 104/60    Weight from Last 3 Encounters:   12/28/17 146 lb (66.2 kg)   12/18/17 142 lb (64.4 kg)   12/07/17 142 lb (64.4 kg)               Primary Care Provider Office Phone # Fax #    [facility] [phone]       [place] [address]  [address] [address]        Equal Access to Services     FLORENCE FREIRE : Hadii jefe ku hadasho Somichael, waaxda luqadaha, qaybta kaalmada adeevaristoda, selvin baig . So [facility] [phone].    ATENCIÓN: Si habla español, tiene a thompson disposición servicios gratuitos de asistencia lingüística. Jamia al [phone].    We comply with applicable federal civil rights laws and [place] laws. We do not discriminate on the basis of race, color, national origin, age, disability, sex, sexual orientation, or gender identity.            Thank you!     Thank you for choosing [facility] [place]  for your care.  Our goal is always to provide you with excellent care. Hearing back from our patients is one way we can continue to improve our services. Please take a few minutes to complete the written survey that you may receive in the mail after your visit with us. Thank you!             Your Updated Medication List - Protect others around you: Learn how to safely use, store and throw away your medicines at www.disposemymeds.org.          This list is accurate as of: 12/28/17 10:34 AM.  Always use your most recent med list.                   Brand Name Dispense Instructions for use Diagnosis    alcohol swab prep pads     100 each    Use to swab area of injection/lamar as directed.    Insulin controlled gestational diabetes mellitus (GDM) in third trimester       aspirin 81 MG tablet     90 tablet    Take 1 tablet (81 mg) by mouth daily    Supervision of high-risk pregnancy of elderly multigravida       blood glucose monitoring lancets     200 each    Use to test blood sugar 4 times daily or as directed.  Ok to substitute alternative if insurance prefers.    Gestational diabetes       blood glucose monitoring test strip    ONETOUCH VERIO IQ    150 strip    Use to test blood sugars 4 times daily or as directed.    Gestational diabetes       insulin isophane human 100 UNIT/ML injection    HumuLIN N PEN    3 mL    5 units before bed    Insulin controlled gestational diabetes mellitus (GDM) in third trimester       insulin pen needle 32G X 4 MM    ULTICARE MICRO    100 each    Use 1 pen needles daily or as directed.    Insulin controlled gestational diabetes mellitus (GDM) in third trimester       nitroFURantoin (macrocrystal-monohydrate) 100 MG capsule    MACROBID    90 capsule    Take 1 capsule (100 mg) by mouth At Bedtime    Asymptomatic bacteriuria       prenatal multivitamin plus iron 27-0.8 MG Tabs per tablet      Take 1 tablet by mouth daily

## 2018-01-04 ENCOUNTER — PRENATAL OFFICE VISIT (OUTPATIENT)
Dept: OBGYN | Facility: CLINIC | Age: 38
End: 2018-01-04
Payer: COMMERCIAL

## 2018-01-04 ENCOUNTER — TRANSFERRED RECORDS (OUTPATIENT)
Dept: HEALTH INFORMATION MANAGEMENT | Facility: CLINIC | Age: 38
End: 2018-01-04

## 2018-01-04 VITALS — WEIGHT: 144 LBS | DIASTOLIC BLOOD PRESSURE: 66 MMHG | BODY MASS INDEX: 29.58 KG/M2 | SYSTOLIC BLOOD PRESSURE: 110 MMHG

## 2018-01-04 DIAGNOSIS — N89.8 VAGINAL INCLUSION CYST: ICD-10-CM

## 2018-01-04 DIAGNOSIS — O24.414 INSULIN CONTROLLED GESTATIONAL DIABETES MELLITUS (GDM) IN THIRD TRIMESTER: Primary | ICD-10-CM

## 2018-01-04 DIAGNOSIS — Z98.891 H/O CESAREAN SECTION: ICD-10-CM

## 2018-01-04 DIAGNOSIS — O09.529 SUPERVISION OF HIGH-RISK PREGNANCY OF ELDERLY MULTIGRAVIDA: ICD-10-CM

## 2018-01-04 PROCEDURE — 99207 ZZC PRENATAL VISIT: CPT | Performed by: OBSTETRICS & GYNECOLOGY

## 2018-01-04 NOTE — PROGRESS NOTES
Prenatal Visit GDM on insulin  Patient Active Problem List   Diagnosis     Supervision of high-risk pregnancy of elderly multigravida     History of gestational diabetes in prior pregnancy, currently pregnant     Hx of preeclampsia, prior pregnancy, currently pregnant, first trimester     H/O  section     Vaginal inclusion cyst     Insulin controlled gestational diabetes mellitus (GDM) in third trimester     Breech presentation       Doing well. Here with  today. Following a diabetic diet. BPP wnl at [facility]. PITA 14. Woody Breech presentation. Currently on NPH 12 units    Fastin-111 (3/7 abnormal)  Post breakfast  1 Hr:  (1/7 abnormal)  Lunch: 113-156 (2/6 abnormal)  Dinner 111-150 (3/6 abnormal)    Overall less than half abnormal blood sugars. Did not increase NPH today. Will reassess in one more week. Will continue  testing with weekly BPPs  Continue ASA  Last growth sonogram at 17 at 32 weeks EFW 1893g 48%tile. Will repeat growth sonogram at 36 weeks.  Continue daily Macrobid  Will discuss route of delivery based on fetal presentation  RTC in 1 week  Maria Ines Delong MD

## 2018-01-04 NOTE — MR AVS SNAPSHOT
After Visit Summary   1/4/2018    Rosmery Garcia    MRN: [id number]           Patient Information     Date Of Birth          1980        Visit Information        Provider Department      1/4/2018 11:10 AM Maria Ines Delong MD [facility]        Today's Diagnoses     Insulin controlled gestational diabetes mellitus (GDM) in third trimester    -  1    Vaginal inclusion cyst        Supervision of high-risk pregnancy of elderly multigravida        History of gestational diabetes in prior pregnancy, currently pregnant           Follow-ups after your visit        Follow-up notes from your care team     Return in about 1 week (around 1/11/2018).      Your next 10 appointments already scheduled     Jan 04, 2018 11:10 AM CST   ESTABLISHED PRENATAL with Maria Ines Delong MD   [facility] [place] ([facility])    [address] [address]   [phone]            Jan 05, 2018 12:00 PM CST   Telephone Visit with Babita Quezada RD   [facility] [place],  Diabetes Education ([facility])    [address] [place] [id number] [phone]           Note: this is not an onsite visit; there is no need to come to the facility.            Jan 18, 2018  9:25 AM CST   US PELVIC COMPLETE W TRANSVAGINAL with WEUS1   [facility] ([facility])    [address] [place] [address]   [phone]           Please bring a list of your medicines (including vitamins, minerals and over-the-counter drugs). Also, tell your doctor about any allergies you may have. Wear comfortable clothes and leave your valuables at home.  Adults: Drink six 8-ounce glasses of fluid one hour before your exam. Do NOT empty your bladder.  If you need to empty your bladder before your exam, try to release only  a little bit of urine. Then, drink another 8oz glass of fluid.  Children: Children who are potty trained should drink at least 4 cups (32 oz) of liquid 45 minutes to one hour prior to the exam. The child s bladder must be full in order to achieve a diagnostic exam. If your child is very uncomfortable or has an urgent need to pee, please notify a technologist; they will try to find out how much longer the wait may be and provide instructions to help relieve the pressure. Occasionally it is medically necessary to insert a urinary catheter to fill the bladder.  Please call the Imaging Department at your exam site with any questions.            Jan 18, 2018 10:00 AM CST   ESTABLISHED PRENATAL with Maria Ines Delong MD   [facility] ([facility])    [address] [place] [address]   [phone]              Future tests that were ordered for you today     Open Future Orders        Priority Expected Expires Ordered    US Fetal Biophys Prof w/o Non Stress Test Routine  1/4/2019 1/4/2018            Who to contact     If you have questions or need follow up information about today's clinic visit or your schedule please contact [facility] [place] directly at [phone].  Normal or non-critical lab and imaging results will be communicated to you by Social & Beyondhart, letter or phone within 4 business days after the clinic has received the results. If you do not hear from us within 7 days, please contact the clinic through Social & Beyondhart or phone. If you have a critical or abnormal lab result, we will notify you by phone as soon as possible.  Submit refill requests through Entigo or call your pharmacy and they will forward the refill request to us. Please allow 3 business days for your refill to be completed.          Additional Information About Your Visit        Entigo Information     Entigo gives you secure access to your electronic health record.  If you see a primary care provider, you can also send messages to your care team and make appointments. If you have questions, please call your primary care clinic.  If you do not have a primary care provider, please call [phone] and they will assist you.        Care EveryWhere ID     This is your Care EveryWhere ID. This could be used by other organizations to access your [place] medical records  [id number]        Your Vitals Were     Last Period BMI (Body Mass Index)                05/18/2017 (Exact Date) 29.58 kg/m2           Blood Pressure from Last 3 Encounters:   01/04/18 110/66   12/28/17 112/74   12/18/17 98/52    Weight from Last 3 Encounters:   01/04/18 144 lb (65.3 kg)   12/28/17 146 lb (66.2 kg)   12/18/17 142 lb (64.4 kg)               Primary Care Provider Office Phone # Fax #    [facility] [phone]       [address] [place]  [address] [address]        Equal Access to Services     FLORENCE FREIRE : Hadii aad ku hadasho Socoltenali, waaxda luqadaha, qaybta kaalmada adestefan, selvin baig . So [facility] [phone].    ATENCIÓN: Si habla español, tiene a thompson disposición servicios gratuitos de asistencia lingüística. Jamia al [phone].    We comply with applicable federal civil rights laws and [place] laws. We do not discriminate on the basis of race, color, national origin, age, disability, sex, sexual orientation, or gender identity.            Thank you!     Thank you for choosing [facility]  for your care. Our goal is always to provide you with excellent care. Hearing back from our patients is one way we can continue to improve our services. Please take a few minutes to complete the written survey that you may receive in the mail after your visit with us. Thank you!             Your Updated Medication List - Protect others around you: Learn how to safely use,  store and throw away your medicines at www.disposemymeds.org.          This list is accurate as of: 1/4/18 11:03 AM.  Always use your most recent med list.                   Brand Name Dispense Instructions for use Diagnosis    alcohol swab prep pads     100 each    Use to swab area of injection/lamar as directed.    Insulin controlled gestational diabetes mellitus (GDM) in third trimester       aspirin 81 MG tablet     90 tablet    Take 1 tablet (81 mg) by mouth daily    Supervision of high-risk pregnancy of elderly multigravida       blood glucose monitoring lancets     200 each    Use to test blood sugar 4 times daily or as directed.  Ok to substitute alternative if insurance prefers.    Gestational diabetes       blood glucose monitoring test strip    ONETOUCH VERIO IQ    150 strip    Use to test blood sugars 4 times daily or as directed.    Gestational diabetes       insulin isophane human 100 UNIT/ML injection    HumuLIN N PEN    3 mL    5 units before bed    Insulin controlled gestational diabetes mellitus (GDM) in third trimester       insulin pen needle 32G X 4 MM    ULTICARE MICRO    100 each    Use 1 pen needles daily or as directed.    Insulin controlled gestational diabetes mellitus (GDM) in third trimester       nitroFURantoin (macrocrystal-monohydrate) 100 MG capsule    MACROBID    90 capsule    Take 1 capsule (100 mg) by mouth At Bedtime    Asymptomatic bacteriuria       prenatal multivitamin plus iron 27-0.8 MG Tabs per tablet      Take 1 tablet by mouth daily

## 2018-01-05 ENCOUNTER — VIRTUAL VISIT (OUTPATIENT)
Dept: EDUCATION SERVICES | Facility: CLINIC | Age: 38
End: 2018-01-05
Attending: OBSTETRICS & GYNECOLOGY
Payer: COMMERCIAL

## 2018-01-05 DIAGNOSIS — O24.414 INSULIN CONTROLLED GESTATIONAL DIABETES MELLITUS (GDM) IN THIRD TRIMESTER: Primary | ICD-10-CM

## 2018-01-05 NOTE — PROGRESS NOTES
Call made to patient to review glucose readings, however, patient was just seen by her OB yesterday and glucose readings were reviewed.   No changes made today.  Follow up scheduled for Tuesday 1/9/18 via phone.  Babita Quezada RD, LD, CDE  1/5/2018   4:07 PM

## 2018-01-09 ENCOUNTER — VIRTUAL VISIT (OUTPATIENT)
Dept: EDUCATION SERVICES | Facility: CLINIC | Age: 38
End: 2018-01-09
Attending: OBSTETRICS & GYNECOLOGY
Payer: COMMERCIAL

## 2018-01-09 DIAGNOSIS — O24.414 INSULIN CONTROLLED GESTATIONAL DIABETES MELLITUS (GDM) IN THIRD TRIMESTER: Primary | ICD-10-CM

## 2018-01-09 NOTE — PROGRESS NOTES
Called patient to review glucose:  Patient is taking 12 units NPH at night.    1/5 - fasting 96, 131 after breakfast, 103 after lunch, 121 after dinner  1/6 - 81, 105, 115, 156  1/7 - 88, 144, 138, 124  1/8 - 92, 140, 122, 121  1/9 - 96, 164    Breakfast today was two tortilla and two eggs which is no different than other days.     Glucose is 78% at goal. No changes recommended.  Phone follow up scheduled on 1/12/18.  Babita Quezada, CECI, LD, CDE  1/9/2018   12:15 PM

## 2018-01-09 NOTE — MR AVS SNAPSHOT
After Visit Summary   1/9/2018    Rosmery Garcia    MRN: [id number]           Patient Information     Date Of Birth          1980        Visit Information        Provider Department      1/9/2018 12:00 PM Babita Quezada RD [facility] [place],  Diabetes Education        Today's Diagnoses     Insulin controlled gestational diabetes mellitus (GDM) in third trimester    -  1       Follow-ups after your visit        Your next 10 appointments already scheduled     Jan 12, 2018 12:15 PM CST   Telephone Visit with Babita Quezada RD   [facility] [place],  Diabetes Education ([facility])    [address] [place] [address]  [facility] [id number] [phone]           Note: this is not an onsite visit; there is no need to come to the facility.            Jan 18, 2018  9:25 AM CST   US PELVIC COMPLETE W TRANSVAGINAL with WEUS1   [facility] ([facility])    [address] [place] [address] 100  [facility] [id number] [phone]           Please bring a list of your medicines (including vitamins, minerals and over-the-counter drugs). Also, tell your doctor about any allergies you may have. Wear comfortable clothes and leave your valuables at home.  Adults: Drink six 8-ounce glasses of fluid one hour before your exam. Do NOT empty your bladder.  If you need to empty your bladder before your exam, try to release only a little bit of urine. Then, drink another 8oz glass of fluid.  Children: Children who are potty trained should drink at least 4 cups (32 oz) of liquid 45 minutes to one hour prior to the exam. The child s bladder must be full in order to achieve a diagnostic exam. If your child is very uncomfortable or has an urgent need to pee, please notify a technologist; they will try to find out how much longer the wait may be and provide instructions to help relieve the pressure. Occasionally it is medically  necessary to insert a urinary catheter to fill the bladder.  Please call the Imaging Department at your exam site with any questions.            Jan 18, 2018 10:00 AM CST   ESTABLISHED PRENATAL with Maria Ines Delong MD   [facility] [place] ([facility])    [address] [phone]              Who to contact     If you have questions or need follow up information about today's clinic visit or your schedule please contact [facility],  DIABETES EDUCATION directly at [phone].  Normal or non-critical lab and imaging results will be communicated to you by [employer], letter or phone within 4 business days after the clinic has received the results. If you do not hear from us within 7 days, please contact the clinic through [employer] or phone. If you have a critical or abnormal lab result, we will notify you by phone as soon as possible.  Submit refill requests through IdeaString or call your pharmacy and they will forward the refill request to us. Please allow 3 business days for your refill to be completed.          Additional Information About Your Visit        [employer] Information     IdeaString gives you secure access to your electronic health record. If you see a primary care provider, you can also send messages to your care team and make appointments. If you have questions, please call your primary care clinic.  If you do not have a primary care provider, please call [phone] and they will assist you.        Care EveryWhere ID     This is your Care EveryWhere ID. This could be used by other organizations to access your [facility] medical records  [id number]        Your Vitals Were     Last Period                   05/18/2017 (Exact Date)            Blood Pressure from Last 3 Encounters:   01/04/18 110/66   12/28/17 112/74   12/18/17 98/52    Weight from Last 3 Encounters:   01/04/18 65.3 kg (144 lb)   12/28/17 66.2 kg (146  lb)   12/18/17 64.4 kg (142 lb)              Today, you had the following     No orders found for display       Primary Care Provider Office Phone # Fax #    [facility] [phone]       [facility] [address]  [facility] [address]        Equal Access to Services     [facility] MOUNA : Hadii aad ku hadasho Soomaali, waaxda luqadaha, qaybta kaalmada adeegyada, waxay idiin hayaan adeeg kharash la'aan . So [facility] [phone].    ATENCIÓN: Si habla español, tiene a thompson disposición servicios gratuitos de asistencia lingüística. Jamia al [phone].    We comply with applicable federal civil rights laws and [place] laws. We do not discriminate on the basis of race, color, national origin, age, disability, sex, sexual orientation, or gender identity.            Thank you!     Thank you for choosing [facility],  DIABETES EDUCATION  for your care. Our goal is always to provide you with excellent care. Hearing back from our patients is one way we can continue to improve our services. Please take a few minutes to complete the written survey that you may receive in the mail after your visit with us. Thank you!             Your Updated Medication List - Protect others around you: Learn how to safely use, store and throw away your medicines at www.disposemymeds.org.          This list is accurate as of: 1/9/18 12:16 PM.  Always use your most recent med list.                   Brand Name Dispense Instructions for use Diagnosis    alcohol swab prep pads     100 each    Use to swab area of injection/lamar as directed.    Insulin controlled gestational diabetes mellitus (GDM) in third trimester       aspirin 81 MG tablet     90 tablet    Take 1 tablet (81 mg) by mouth daily    Supervision of high-risk pregnancy of elderly multigravida       blood glucose monitoring lancets     200 each    Use to test blood sugar 4 times daily or as directed.  Ok to  substitute alternative if insurance prefers.    Gestational diabetes       blood glucose monitoring test strip    ONETOUCH VERIO IQ    150 strip    Use to test blood sugars 4 times daily or as directed.    Gestational diabetes       insulin isophane human 100 UNIT/ML injection    HumuLIN N PEN    3 mL    5 units before bed    Insulin controlled gestational diabetes mellitus (GDM) in third trimester       insulin pen needle 32G X 4 MM    ULTICARE MICRO    100 each    Use 1 pen needles daily or as directed.    Insulin controlled gestational diabetes mellitus (GDM) in third trimester       nitroFURantoin (macrocrystal-monohydrate) 100 MG capsule    MACROBID    90 capsule    Take 1 capsule (100 mg) by mouth At Bedtime    Asymptomatic bacteriuria       prenatal multivitamin plus iron 27-0.8 MG Tabs per tablet      Take 1 tablet by mouth daily

## 2018-01-12 ENCOUNTER — VIRTUAL VISIT (OUTPATIENT)
Dept: EDUCATION SERVICES | Facility: CLINIC | Age: 38
End: 2018-01-12
Attending: OBSTETRICS & GYNECOLOGY
Payer: COMMERCIAL

## 2018-01-12 DIAGNOSIS — O24.414 INSULIN CONTROLLED GESTATIONAL DIABETES MELLITUS (GDM) IN THIRD TRIMESTER: Primary | ICD-10-CM

## 2018-01-12 NOTE — PROGRESS NOTES
Attempted to reach patient via phone to review glucose readings but no answer. Voice message left asking patient to return call.  Babita Quezada RD, LD, CDE  1/12/2018   12:31 PM

## 2018-01-12 NOTE — MR AVS SNAPSHOT
After Visit Summary   1/12/2018    Rosmery Garcia    MRN: [id number]           Patient Information     Date Of Birth          1980        Visit Information        Provider Department      1/12/2018 12:15 PM Babita Quezada RD [facility] [place],  Diabetes Education        Today's Diagnoses     Insulin controlled gestational diabetes mellitus (GDM) in third trimester    -  1       Follow-ups after your visit        Your next 10 appointments already scheduled     Jan 18, 2018  9:25 AM CST   US PELVIC COMPLETE W TRANSVAGINAL with WEUS1   [facility] ([facility])    [address] [place] [id number]   [phone]           Please bring a list of your medicines (including vitamins, minerals and over-the-counter drugs). Also, tell your doctor about any allergies you may have. Wear comfortable clothes and leave your valuables at home.  Adults: Drink six 8-ounce glasses of fluid one hour before your exam. Do NOT empty your bladder.  If you need to empty your bladder before your exam, try to release only a little bit of urine. Then, drink another 8oz glass of fluid.  Children: Children who are potty trained should drink at least 4 cups (32 oz) of liquid 45 minutes to one hour prior to the exam. The child s bladder must be full in order to achieve a diagnostic exam. If your child is very uncomfortable or has an urgent need to pee, please notify a technologist; they will try to find out how much longer the wait may be and provide instructions to help relieve the pressure. Occasionally it is medically necessary to insert a urinary catheter to fill the bladder.  Please call the Imaging Department at your exam site with any questions.            Jan 18, 2018 10:00 AM CST   ESTABLISHED PRENATAL with Maria Ines Delong MD   [facility] ([facility])    [address]  [place] [address] [phone]              Who to contact     If you have questions or need follow up information about today's clinic visit or your schedule please contact [facility],  DIABETES EDUCATION directly at [phone].  Normal or non-critical lab and imaging results will be communicated to you by MyChart, letter or phone within 4 business days after the clinic has received the results. If you do not hear from us within 7 days, please contact the clinic through [employer] or phone. If you have a critical or abnormal lab result, we will notify you by phone as soon as possible.  Submit refill requests through [employer] or call your pharmacy and they will forward the refill request to us. Please allow 3 business days for your refill to be completed.          Additional Information About Your Visit        [employer]ree[employer] Information     [employer] gives you secure access to your electronic health record. If you see a primary care provider, you can also send messages to your care team and make appointments. If you have questions, please call your primary care clinic.  If you do not have a primary care provider, please call [phone] and they will assist you.        Care EveryWhere ID     This is your Care EveryWhere ID. This could be used by other organizations to access your [facility] medical records  [id number]        Your Vitals Were     Last Period                   05/18/2017 (Exact Date)            Blood Pressure from Last 3 Encounters:   01/04/18 110/66   12/28/17 112/74   12/18/17 98/52    Weight from Last 3 Encounters:   01/04/18 65.3 kg (144 lb)   12/28/17 66.2 kg (146 lb)   12/18/17 64.4 kg (142 lb)              Today, you had the following     No orders found for display       Primary Care Provider Office Phone # Fax #    [facility] [phone]       [facility] [address]  [place] [address]        Equal Access to Services      [place] FREIRE : Hadii aad ku amairani Tinoco, waaxda luqadaha, qaybta kaalmada caitlinevaristobri, selvin shayshainaleelee walsh. So [facility] [phone].    ATENCIÓN: Si habla español, tiene a thompson disposición servicios gratuitos de asistencia lingüística. Llame al [phone].    We comply with applicable federal civil rights laws and [place] laws. We do not discriminate on the basis of race, color, national origin, age, disability, sex, sexual orientation, or gender identity.            Thank you!     Thank you for choosing [facility]  DIABETES EDUCATION  for your care. Our goal is always to provide you with excellent care. Hearing back from our patients is one way we can continue to improve our services. Please take a few minutes to complete the written survey that you may receive in the mail after your visit with us. Thank you!             Your Updated Medication List - Protect others around you: Learn how to safely use, store and throw away your medicines at www.disposemymeds.org.          This list is accurate as of: 1/12/18 12:31 PM.  Always use your most recent med list.                   Brand Name Dispense Instructions for use Diagnosis    alcohol swab prep pads     100 each    Use to swab area of injection/lamar as directed.    Insulin controlled gestational diabetes mellitus (GDM) in third trimester       aspirin 81 MG tablet     90 tablet    Take 1 tablet (81 mg) by mouth daily    Supervision of high-risk pregnancy of elderly multigravida       blood glucose monitoring lancets     200 each    Use to test blood sugar 4 times daily or as directed.  Ok to substitute alternative if insurance prefers.    Gestational diabetes       blood glucose monitoring test strip    ONETOUCH VERIO IQ    150 strip    Use to test blood sugars 4 times daily or as directed.    Gestational diabetes       insulin isophane human 100 UNIT/ML injection    HumuLIN N PEN    3 mL    5 units before bed    Insulin controlled  gestational diabetes mellitus (GDM) in third trimester       insulin pen needle 32G X 4 MM    ULTICARE MICRO    100 each    Use 1 pen needles daily or as directed.    Insulin controlled gestational diabetes mellitus (GDM) in third trimester       nitroFURantoin (macrocrystal-monohydrate) 100 MG capsule    MACROBID    90 capsule    Take 1 capsule (100 mg) by mouth At Bedtime    Asymptomatic bacteriuria       prenatal multivitamin plus iron 27-0.8 MG Tabs per tablet      Take 1 tablet by mouth daily

## 2018-01-18 ENCOUNTER — PRENATAL OFFICE VISIT (OUTPATIENT)
Dept: OBGYN | Facility: CLINIC | Age: 38
End: 2018-01-18
Payer: COMMERCIAL

## 2018-01-18 ENCOUNTER — RADIANT APPOINTMENT (OUTPATIENT)
Dept: ULTRASOUND IMAGING | Facility: CLINIC | Age: 38
End: 2018-01-18
Payer: COMMERCIAL

## 2018-01-18 VITALS — WEIGHT: 148 LBS | DIASTOLIC BLOOD PRESSURE: 84 MMHG | BODY MASS INDEX: 30.41 KG/M2 | SYSTOLIC BLOOD PRESSURE: 128 MMHG

## 2018-01-18 DIAGNOSIS — O09.529 SUPERVISION OF HIGH-RISK PREGNANCY OF ELDERLY MULTIGRAVIDA: ICD-10-CM

## 2018-01-18 DIAGNOSIS — O09.299 HISTORY OF GESTATIONAL DIABETES IN PRIOR PREGNANCY, CURRENTLY PREGNANT: ICD-10-CM

## 2018-01-18 DIAGNOSIS — N89.8 VAGINAL INCLUSION CYST: ICD-10-CM

## 2018-01-18 DIAGNOSIS — Z23 NEED FOR VACCINATION: ICD-10-CM

## 2018-01-18 DIAGNOSIS — O24.414 INSULIN CONTROLLED GESTATIONAL DIABETES MELLITUS (GDM) IN THIRD TRIMESTER: Primary | ICD-10-CM

## 2018-01-18 DIAGNOSIS — O24.414 INSULIN CONTROLLED GESTATIONAL DIABETES MELLITUS (GDM) IN THIRD TRIMESTER: ICD-10-CM

## 2018-01-18 DIAGNOSIS — Z86.32 HISTORY OF GESTATIONAL DIABETES IN PRIOR PREGNANCY, CURRENTLY PREGNANT: ICD-10-CM

## 2018-01-18 DIAGNOSIS — Z36.85 SCREENING, ANTENATAL, FOR STREPTOCOCCUS B: ICD-10-CM

## 2018-01-18 PROCEDURE — 99207 ZZC PRENATAL VISIT: CPT | Performed by: OBSTETRICS & GYNECOLOGY

## 2018-01-18 PROCEDURE — 90715 TDAP VACCINE 7 YRS/> IM: CPT | Performed by: OBSTETRICS & GYNECOLOGY

## 2018-01-18 PROCEDURE — 87653 STREP B DNA AMP PROBE: CPT | Performed by: OBSTETRICS & GYNECOLOGY

## 2018-01-18 PROCEDURE — 90471 IMMUNIZATION ADMIN: CPT | Performed by: OBSTETRICS & GYNECOLOGY

## 2018-01-18 PROCEDURE — 87186 SC STD MICRODIL/AGAR DIL: CPT | Performed by: OBSTETRICS & GYNECOLOGY

## 2018-01-18 PROCEDURE — 76819 FETAL BIOPHYS PROFIL W/O NST: CPT | Performed by: OBSTETRICS & GYNECOLOGY

## 2018-01-18 NOTE — PROGRESS NOTES
Prenatal Visit  Doing well. Good fetal movement. No regular contractions. TDAP given. BPP reassuring with cephalic presentation and PITA of 18. Counseled about need for delivery at 39 weeks and is ok with being induced on 2/15 even if it means not delivering on Alvaro's birthday. Concerned about left ear ringing and decreasing hearing over the past 3 weeks. Her 8 year old stayed home with a slight cold  GBS collected.  Inner ear examined with otoscope. Ear wax bilaterally. No erythema of either ear drum with good light reflection.  Advised to use ear wax removal kit.   Will refer to Audiology if this persists postpartum  Blood sugar review: Currently on NPH 12 units at night time  1 Hr PP  Fastin-98 ( 2 out of 9 abnormal)  Breakfast  ( no abnormals)  Lunch 109-159 (  abnormal)  Dinner 111-169 () abnormal  She states that she is having more to eat at dinner time and snacks on yogurt and fruit between lunch and dinner. I advised her to control her portion size. If more than half of dinner time are elevated by her next visit she will be placed on a small dose of aspart with dinner.  She was advised to touch base with the Diabetic Educator as she missed the last virtual visit.    Growth sonogram next week with BPP  RTC in 1 week. SVE with next appointment.  Maria Ines Delong MD

## 2018-01-18 NOTE — MR AVS SNAPSHOT
After Visit Summary   2018    Rosmery Garcia    MRN: [id number]           Patient Information     Date Of Birth          1980        Visit Information        Provider Department      2018 10:00 AM Maria Ines Delong MD [facility]        Today's Diagnoses     Insulin controlled gestational diabetes mellitus (GDM) in third trimester    -  1    Vaginal inclusion cyst        Supervision of high-risk pregnancy of elderly multigravida        History of gestational diabetes in prior pregnancy, currently pregnant        Need for vaccination        Screening, , for Streptococcus B           Follow-ups after your visit        Follow-up notes from your care team     Return in about 1 week (around 2018).      Your next 10 appointments already scheduled     2018  8:40 AM CST   US PELVIC COMPLETE W TRANSVAGINAL with WEUS1   [facility] ([facility])    [address] [place] [id number] [phone]           Please bring a list of your medicines (including vitamins, minerals and over-the-counter drugs). Also, tell your doctor about any allergies you may have. Wear comfortable clothes and leave your valuables at home.  Adults: Drink six 8-ounce glasses of fluid one hour before your exam. Do NOT empty your bladder.  If you need to empty your bladder before your exam, try to release only a little bit of urine. Then, drink another 8oz glass of fluid.  Children: Children who are potty trained should drink at least 4 cups (32 oz) of liquid 45 minutes to one hour prior to the exam. The child s bladder must be full in order to achieve a diagnostic exam. If your child is very uncomfortable or has an urgent need to pee, please notify a technologist; they will try to find out how much longer the wait may be and provide instructions to help relieve the pressure. Occasionally it is  medically necessary to insert a urinary catheter to fill the bladder.  Please call the Imaging Department at your exam site with any questions.            Jan 25, 2018  9:10 AM CST   ESTABLISHED PRENATAL with Maria Ines Delong MD   [facility] [place] ([facility] [place])    [address] [place] [address]  [place] [address] [phone]              Future tests that were ordered for you today     Open Future Orders        Priority Expected Expires Ordered    US OB Ltd One Or More Fetus FU/Repeat Routine  1/18/2019 1/18/2018            Who to contact     If you have questions or need follow up information about today's clinic visit or your schedule please contact [facility] [place] directly at [phone].  Normal or non-critical lab and imaging results will be communicated to you by MyChart, letter or phone within 4 business days after the clinic has received the results. If you do not hear from us within 7 days, please contact the clinic through [employer]hart or phone. If you have a critical or abnormal lab result, we will notify you by phone as soon as possible.  Submit refill requests through PsyQic or call your pharmacy and they will forward the refill request to us. Please allow 3 business days for your refill to be completed.          Additional Information About Your Visit        Ps[employer]ic Information     [employer] gives you secure access to your electronic health record. If you see a primary care provider, you can also send messages to your care team and make appointments. If you have questions, please call your primary care clinic.  If you do not have a primary care provider, please call [phone] and they will assist you.        Care EveryWhere ID     This is your Care EveryWhere ID. This could be used by other organizations to access your [place] medical records  [id number]        Your Vitals Were     Last Period Breastfeeding? BMI (Body Mass Index)              05/18/2017 (Exact Date) No 30.41 kg/m2          Blood Pressure from Last 3 Encounters:   01/18/18 128/84   01/04/18 110/66   12/28/17 112/74    Weight from Last 3 Encounters:   01/18/18 148 lb (67.1 kg)   01/04/18 144 lb (65.3 kg)   12/28/17 146 lb (66.2 kg)              We Performed the Following     1st  Administration  [03120]     Group B strep PCR     TDAP VACCINE (ADACEL) [00908.002]        Primary Care Provider Office Phone # Fax #    [facility] [phone]       [address] [place]  [facility] [address]  [facility] [address]        Equal Access to Services     FLORENCE FREIRE : Hadii jefe ngoo Somichael, waaxda luqadaha, qaybta kaalmada adepatyabri, selvin walsh. So [facility] [phone].    ATENCIÓN: Si habla español, tiene a thompson disposición servicios gratuitos de asistencia lingüística. Llame al [phone].    We comply with applicable federal civil rights laws and [place] laws. We do not discriminate on the basis of race, color, national origin, age, disability, sex, sexual orientation, or gender identity.            Thank you!     Thank you for choosing [facility]  for your care. Our goal is always to provide you with excellent care. Hearing back from our patients is one way we can continue to improve our services. Please take a few minutes to complete the written survey that you may receive in the mail after your visit with us. Thank you!             Your Updated Medication List - Protect others around you: Learn how to safely use, store and throw away your medicines at www.disposemymeds.org.          This list is accurate as of: 1/18/18 10:27 AM.  Always use your most recent med list.                   Brand Name Dispense Instructions for use Diagnosis    alcohol swab prep pads     100 each    Use to swab area of injection/lamar as directed.    Insulin controlled gestational diabetes  mellitus (GDM) in third trimester       aspirin 81 MG tablet     90 tablet    Take 1 tablet (81 mg) by mouth daily    Supervision of high-risk pregnancy of elderly multigravida       blood glucose monitoring lancets     200 each    Use to test blood sugar 4 times daily or as directed.  Ok to substitute alternative if insurance prefers.    Gestational diabetes       blood glucose monitoring test strip    ONETOUCH VERIO IQ    150 strip    Use to test blood sugars 4 times daily or as directed.    Gestational diabetes       insulin isophane human 100 UNIT/ML injection    HumuLIN N PEN    3 mL    5 units before bed    Insulin controlled gestational diabetes mellitus (GDM) in third trimester       insulin pen needle 32G X 4 MM    ULTICARE MICRO    100 each    Use 1 pen needles daily or as directed.    Insulin controlled gestational diabetes mellitus (GDM) in third trimester       prenatal multivitamin plus iron 27-0.8 MG Tabs per tablet      Take 1 tablet by mouth daily

## 2018-01-19 LAB
GP B STREP DNA SPEC QL NAA+PROBE: POSITIVE
SPECIMEN SOURCE: ABNORMAL

## 2018-01-22 PROBLEM — B95.1 POSITIVE GBS TEST: Status: ACTIVE | Noted: 2018-01-22

## 2018-01-22 LAB
BACTERIA SPEC CULT: ABNORMAL
SPECIMEN SOURCE: ABNORMAL

## 2018-01-25 ENCOUNTER — PRENATAL OFFICE VISIT (OUTPATIENT)
Dept: OBGYN | Facility: CLINIC | Age: 38
End: 2018-01-25
Payer: COMMERCIAL

## 2018-01-25 ENCOUNTER — RADIANT APPOINTMENT (OUTPATIENT)
Dept: ULTRASOUND IMAGING | Facility: CLINIC | Age: 38
End: 2018-01-25
Payer: COMMERCIAL

## 2018-01-25 VITALS — WEIGHT: 148 LBS | SYSTOLIC BLOOD PRESSURE: 130 MMHG | BODY MASS INDEX: 30.41 KG/M2 | DIASTOLIC BLOOD PRESSURE: 80 MMHG

## 2018-01-25 DIAGNOSIS — O24.414 INSULIN CONTROLLED GESTATIONAL DIABETES MELLITUS (GDM) IN THIRD TRIMESTER: Primary | ICD-10-CM

## 2018-01-25 DIAGNOSIS — Z86.32 HISTORY OF GESTATIONAL DIABETES IN PRIOR PREGNANCY, CURRENTLY PREGNANT: ICD-10-CM

## 2018-01-25 DIAGNOSIS — O09.299 HISTORY OF GESTATIONAL DIABETES IN PRIOR PREGNANCY, CURRENTLY PREGNANT: ICD-10-CM

## 2018-01-25 DIAGNOSIS — O09.529 SUPERVISION OF HIGH-RISK PREGNANCY OF ELDERLY MULTIGRAVIDA: ICD-10-CM

## 2018-01-25 DIAGNOSIS — O24.414 INSULIN CONTROLLED GESTATIONAL DIABETES MELLITUS (GDM) IN THIRD TRIMESTER: ICD-10-CM

## 2018-01-25 DIAGNOSIS — B95.1 POSITIVE GBS TEST: ICD-10-CM

## 2018-01-25 DIAGNOSIS — N89.8 VAGINAL INCLUSION CYST: ICD-10-CM

## 2018-01-25 PROCEDURE — 99207 ZZC PRENATAL VISIT: CPT | Performed by: OBSTETRICS & GYNECOLOGY

## 2018-01-25 PROCEDURE — 76816 OB US FOLLOW-UP PER FETUS: CPT | Performed by: OBSTETRICS & GYNECOLOGY

## 2018-01-25 PROCEDURE — 76819 FETAL BIOPHYS PROFIL W/O NST: CPT | Performed by: OBSTETRICS & GYNECOLOGY

## 2018-01-25 NOTE — PROGRESS NOTES
Prenatal Visit: Doing well overall. Here with Alvaro. No contractions. No vaginal bleeding. Good fetal movement.  Accuchecks:  F 66-86  B     D   SVE: closed/long/high  Sonogram with EFW at 37%tile and normal PITA. Reassuring BPP.  We discussed further her wishes about repeat  section versus TOLAC. Discussed increased risk of uterine rupture with augmentation with oxytocin. At this time she is still leaning towards TOLAC.    Regarding the GDM, blood sugars are better. Could be better adherence versus placental insufficiency as her fetus is at the 37%tile and was in the 48%tile at her last growth sonogram on . Will continue with the weekly BPPs and plan for delivery at 39 weeks.     BP noted, no preE symptoms    Discussed positive GBS status today    RTC in 1 week.    Maria Ines Delong MD

## 2018-01-25 NOTE — MR AVS SNAPSHOT
After Visit Summary   1/25/2018    Rosmery Garcia    MRN: [id number]           Patient Information     Date Of Birth          1980        Visit Information        Provider Department      1/25/2018 9:10 AM Maria Ines Delong MD [facility]        Today's Diagnoses     Insulin controlled gestational diabetes mellitus (GDM) in third trimester    -  1    Positive GBS test        Vaginal inclusion cyst        Supervision of high-risk pregnancy of elderly multigravida        History of gestational diabetes in prior pregnancy, currently pregnant           Follow-ups after your visit        Follow-up notes from your care team     Return in about 1 week (around 2/1/2018).      Your next 10 appointments already scheduled     Jan 29, 2018  9:00 AM CST   US PELVIC COMPLETE W TRANSVAGINAL with WEUS2   [facility] ([facility])    [address] [place] [id number] [phone]           Please bring a list of your medicines (including vitamins, minerals and over-the-counter drugs). Also, tell your doctor about any allergies you may have. Wear comfortable clothes and leave your valuables at home.  Adults: Drink six 8-ounce glasses of fluid one hour before your exam. Do NOT empty your bladder.  If you need to empty your bladder before your exam, try to release only a little bit of urine. Then, drink another 8oz glass of fluid.  Children: Children who are potty trained should drink at least 4 cups (32 oz) of liquid 45 minutes to one hour prior to the exam. The child s bladder must be full in order to achieve a diagnostic exam. If your child is very uncomfortable or has an urgent need to pee, please notify a technologist; they will try to find out how much longer the wait may be and provide instructions to help relieve the pressure. Occasionally it is medically necessary to insert a urinary catheter to fill the  bladder.  Please call the Imaging Department at your exam site with any questions.            Jan 29, 2018  9:40 AM CST   ESTABLISHED PRENATAL with Maria Ines Delong MD   [facility] ([facility] [place])    [address] [place] [address]  [place] [id number] [phone]              Who to contact     If you have questions or need follow up information about today's clinic visit or your schedule please contact [facility] directly at [phone].  Normal or non-critical lab and imaging results will be communicated to you by [employer]shart, letter or phone within 4 business days after the clinic has received the results. If you do not hear from us within 7 days, please contact the clinic through [employer] or phone. If you have a critical or abnormal lab result, we will notify you by phone as soon as possible.  Submit refill requests through Changelight or call your pharmacy and they will forward the refill request to us. Please allow 3 business days for your refill to be completed.          Additional Information About Your Visit        Changelight Information     [employer]angel[employer] gives you secure access to your electronic health record. If you see a primary care provider, you can also send messages to your care team and make appointments. If you have questions, please call your primary care clinic.  If you do not have a primary care provider, please call [phone] and they will assist you.        Care EveryWhere ID     This is your Care EveryWhere ID. This could be used by other organizations to access your [place] medical records  [id number]        Your Vitals Were     Last Period Breastfeeding? BMI (Body Mass Index)             05/18/2017 (Exact Date) No 30.41 kg/m2          Blood Pressure from Last 3 Encounters:   01/25/18 130/80   01/18/18 128/84   01/04/18 110/66    Weight from Last 3 Encounters:   01/25/18 148 lb (67.1 kg)   01/18/18 148 lb (67.1 kg)    01/04/18 144 lb (65.3 kg)              Today, you had the following     No orders found for display         Where to get your medicines      These medications were sent to [id number] - [place], [place] - [address]  [address] [address]     Phone:  [phone]     insulin pen needle 32G X 4 MM          Primary Care Provider Office Phone # Fax #    [facility] [place] [facility] [phone]       [facility] [address]  [address] [facility] [address]  [place] [address]        Equal Access to Services     [facility] MOUNA : Hadii aad ku hadasho Soomaali, waaxda luqadaha, qaybta kaalmada adeegyada, selvin baig . So [facility] [phone].    ATENCIÓN: Si habla español, tiene a thompson disposición servicios gratuitos de asistencia lingüística. Jamia al [phone].    We comply with applicable federal civil rights laws and [place] laws. We do not discriminate on the basis of race, color, national origin, age, disability, sex, sexual orientation, or gender identity.            Thank you!     Thank you for choosing [facility]  for your care. Our goal is always to provide you with excellent care. Hearing back from our patients is one way we can continue to improve our services. Please take a few minutes to complete the written survey that you may receive in the mail after your visit with us. Thank you!             Your Updated Medication List - Protect others around you: Learn how to safely use, store and throw away your medicines at www.disposemymeds.org.          This list is accurate as of 1/25/18 11:07 AM.  Always use your most recent med list.                   Brand Name Dispense Instructions for use Diagnosis    alcohol swab prep pads     100 each    Use to swab area of injection/lamar as directed.    Insulin controlled gestational diabetes mellitus (GDM) in third trimester       aspirin 81 MG tablet      90 tablet    Take 1 tablet (81 mg) by mouth daily    Supervision of high-risk pregnancy of elderly multigravida       blood glucose monitoring lancets     200 each    Use to test blood sugar 4 times daily or as directed.  Ok to substitute alternative if insurance prefers.    Gestational diabetes       blood glucose monitoring test strip    ONETOUCH VERIO IQ    150 strip    Use to test blood sugars 4 times daily or as directed.    Gestational diabetes       insulin isophane human 100 UNIT/ML injection    HumuLIN N PEN    3 mL    5 units before bed    Insulin controlled gestational diabetes mellitus (GDM) in third trimester       insulin pen needle 32G X 4 MM    ULTICARE MICRO    100 each    Use 1 pen needles daily or as directed.    Insulin controlled gestational diabetes mellitus (GDM) in third trimester       prenatal multivitamin plus iron 27-0.8 MG Tabs per tablet      Take 1 tablet by mouth daily

## 2018-01-29 ENCOUNTER — RADIANT APPOINTMENT (OUTPATIENT)
Dept: ULTRASOUND IMAGING | Facility: CLINIC | Age: 38
End: 2018-01-29
Payer: COMMERCIAL

## 2018-01-29 ENCOUNTER — PRENATAL OFFICE VISIT (OUTPATIENT)
Dept: OBGYN | Facility: CLINIC | Age: 38
End: 2018-01-29
Payer: COMMERCIAL

## 2018-01-29 VITALS — DIASTOLIC BLOOD PRESSURE: 74 MMHG | WEIGHT: 153 LBS | BODY MASS INDEX: 31.43 KG/M2 | SYSTOLIC BLOOD PRESSURE: 112 MMHG

## 2018-01-29 DIAGNOSIS — O24.414 INSULIN CONTROLLED GESTATIONAL DIABETES MELLITUS (GDM) IN THIRD TRIMESTER: ICD-10-CM

## 2018-01-29 DIAGNOSIS — Z86.32 HISTORY OF GESTATIONAL DIABETES IN PRIOR PREGNANCY, CURRENTLY PREGNANT: ICD-10-CM

## 2018-01-29 DIAGNOSIS — O09.299 HISTORY OF GESTATIONAL DIABETES IN PRIOR PREGNANCY, CURRENTLY PREGNANT: ICD-10-CM

## 2018-01-29 DIAGNOSIS — O09.529 SUPERVISION OF HIGH-RISK PREGNANCY OF ELDERLY MULTIGRAVIDA: ICD-10-CM

## 2018-01-29 DIAGNOSIS — N89.8 VAGINAL INCLUSION CYST: ICD-10-CM

## 2018-01-29 DIAGNOSIS — B95.1 POSITIVE GBS TEST: ICD-10-CM

## 2018-01-29 PROCEDURE — 99207 ZZC PRENATAL VISIT: CPT | Performed by: OBSTETRICS & GYNECOLOGY

## 2018-01-29 PROCEDURE — 76819 FETAL BIOPHYS PROFIL W/O NST: CPT | Performed by: OBSTETRICS & GYNECOLOGY

## 2018-01-29 NOTE — PROGRESS NOTES
36w4d; ABRIL: 2/22/18  Pt has BPP U/S appt scheduled at [facility] on 2/5/18 at 8:00 AM and then an appt with Dr. Delong afterwards at 9:00 AM. [facility] order form filled out and faxed to [facility] ([phone]) at 10:25 AM on 1/29/18. Verified online that fax was successfully sent.

## 2018-01-29 NOTE — MR AVS SNAPSHOT
After Visit Summary   1/29/2018    Rosmery Garcia    MRN: [id number]           Patient Information     Date Of Birth          1980        Visit Information        Provider Department      1/29/2018 9:40 AM Maria Ines [facility] MD [facility]        Today's Diagnoses     Positive GBS test        Vaginal inclusion cyst        Supervision of high-risk pregnancy of elderly multigravida        History of gestational diabetes in prior pregnancy, currently pregnant           Follow-ups after your visit        Follow-up notes from your care team     Return in about 1 week (around 2/5/2018).      Your next 10 appointments already scheduled     Feb 05, 2018  9:00 AM CST   ESTABLISHED PRENATAL with Maria Ines [facility]    [address] [place] [id number] [phone]              Who to contact     If you have questions or need follow up information about today's clinic visit or your schedule please contact [facility] [place] directly at [phone].  Normal or non-critical lab and imaging results will be communicated to you by [employer]hart, letter or phone within 4 business days after the clinic has received the results. If you do not hear from us within 7 days, please contact the clinic through [employer] or phone. If you have a critical or abnormal lab result, we will notify you by phone as soon as possible.  Submit refill requests through Amadesa or call your pharmacy and they will forward the refill request to us. Please allow 3 business days for your refill to be completed.          Additional Information About Your Visit        [employer] Information     Amadesa gives you secure access to your electronic health record. If you see a primary care provider, you can also send messages to your care team and make appointments. If you have questions, please call your  primary care clinic.  If you do not have a primary care provider, please call [phone] and they will assist you.        Care EveryWhere ID     This is your Care EveryWhere ID. This could be used by other organizations to access your [place] medical records  [id number]        Your Vitals Were     Last Period Breastfeeding? BMI (Body Mass Index)             05/18/2017 (Exact Date) No 31.43 kg/m2          Blood Pressure from Last 3 Encounters:   01/29/18 112/74   01/25/18 130/80   01/18/18 128/84    Weight from Last 3 Encounters:   01/29/18 153 lb (69.4 kg)   01/25/18 148 lb (67.1 kg)   01/18/18 148 lb (67.1 kg)              Today, you had the following     No orders found for display       Primary Care Provider Office Phone # Fax #    [facility] [phone]       [address] [address]        Equal Access to Services     [facility]OR[facility] FREIRE : Hadii aad ku hadasho Soomaali, waaxda luqadaha, qaybta kaalmada adeegyada, selvin pinedo haykelsea baig . So [facility] [phone].    ATENCIÓN: Si habla español, tiene a thompson disposición servicios gratuitos de asistencia lingüística. Llame al [phone].    We comply with applicable federal civil rights laws and [place] laws. We do not discriminate on the basis of race, color, national origin, age, disability, sex, sexual orientation, or gender identity.            Thank you!     Thank you for choosing [facility]  for your care. Our goal is always to provide you with excellent care. Hearing back from our patients is one way we can continue to improve our services. Please take a few minutes to complete the written survey that you may receive in the mail after your visit with us. Thank you!             Your Updated Medication List - Protect others around you: Learn how to safely use, store and throw away your medicines at www.disposemymeds.org.           This list is accurate as of 1/29/18 10:54 AM.  Always use your most recent med list.                   Brand Name Dispense Instructions for use Diagnosis    alcohol swab prep pads     100 each    Use to swab area of injection/lamar as directed.    Insulin controlled gestational diabetes mellitus (GDM) in third trimester       aspirin 81 MG tablet     90 tablet    Take 1 tablet (81 mg) by mouth daily    Supervision of high-risk pregnancy of elderly multigravida       blood glucose monitoring lancets     200 each    Use to test blood sugar 4 times daily or as directed.  Ok to substitute alternative if insurance prefers.    Gestational diabetes       blood glucose monitoring test strip    ONETOUCH VERIO IQ    150 strip    Use to test blood sugars 4 times daily or as directed.    Gestational diabetes       insulin isophane human 100 UNIT/ML injection    HumuLIN N PEN    3 mL    5 units before bed    Insulin controlled gestational diabetes mellitus (GDM) in third trimester       insulin pen needle 32G X 4 MM    ULTICARE MICRO    100 each    Use 1 pen needles daily or as directed.    Insulin controlled gestational diabetes mellitus (GDM) in third trimester       prenatal multivitamin plus iron 27-0.8 MG Tabs per tablet      Take 1 tablet by mouth daily

## 2018-01-29 NOTE — PROGRESS NOTES
Prenatal Visit  Doing well. Intermittent contractions. Not sleeping well overnight and having frequent awakenings. Good fetal movement. BPP with PITA of 21 today and cephalic presentation. 8/8  SVE: outer os fingertip but internal os closed/50/-3  Blood sugars reviewed. Only one dinner out of range at 157.  Ok to continue with NPH 12 units at night time  Plan is still for TOLAC. Will schedule IOL at 38 weeks for 39 weeks. No prostaglandins due to uterine scar. If cervical ripening is needed will need to use omer bulb the night before.  Continue ASA for one more week.  Continue Macrobid  RTC in 1 week  Maria Ines Delong MD

## 2018-02-05 ENCOUNTER — PRENATAL OFFICE VISIT (OUTPATIENT)
Dept: OBGYN | Facility: CLINIC | Age: 38
End: 2018-02-05
Payer: COMMERCIAL

## 2018-02-05 ENCOUNTER — TRANSFERRED RECORDS (OUTPATIENT)
Dept: HEALTH INFORMATION MANAGEMENT | Facility: CLINIC | Age: 38
End: 2018-02-05

## 2018-02-05 VITALS — WEIGHT: 153 LBS | SYSTOLIC BLOOD PRESSURE: 122 MMHG | BODY MASS INDEX: 31.43 KG/M2 | DIASTOLIC BLOOD PRESSURE: 78 MMHG

## 2018-02-05 DIAGNOSIS — B95.1 POSITIVE GBS TEST: ICD-10-CM

## 2018-02-05 DIAGNOSIS — N89.8 VAGINAL INCLUSION CYST: ICD-10-CM

## 2018-02-05 DIAGNOSIS — O09.529 SUPERVISION OF HIGH-RISK PREGNANCY OF ELDERLY MULTIGRAVIDA: ICD-10-CM

## 2018-02-05 DIAGNOSIS — Z98.891 H/O CESAREAN SECTION: ICD-10-CM

## 2018-02-05 DIAGNOSIS — Z3A.37 37 WEEKS GESTATION OF PREGNANCY: ICD-10-CM

## 2018-02-05 DIAGNOSIS — O24.414 INSULIN CONTROLLED GESTATIONAL DIABETES MELLITUS (GDM) IN THIRD TRIMESTER: Primary | ICD-10-CM

## 2018-02-05 PROCEDURE — 99207 ZZC PRENATAL VISIT: CPT | Performed by: OBSTETRICS & GYNECOLOGY

## 2018-02-05 NOTE — PROGRESS NOTES
Prenatal Visit  Doing well overall. States that there is good fetal movement. Sonogram at [facility] this AM with transverse fetal presentation. Otherwise BPP wnl with PITA of 19. Blood sugars reviewed and only 1/6 abnormal for post breakfast, lunch and dinner.   SVE: 0.5/50/-3  Non-vertex by palpation  We discussed the new presentation of the fetus and that if this persists we can discuss risks/benefits of version versus a  section. As the fetus has been vertex until today will plan for one more visit and make a plan based on that.  RTC in 1 week for BPP and visit.  Maria Ines Delong MD

## 2018-02-05 NOTE — MR AVS SNAPSHOT
After Visit Summary   2018    Rosmery Garcia    MRN: [id number]           Patient Information     Date Of Birth          1980        Visit Information        Provider Department      2018 9:00 AM Maria Ines Delong MD [facility] [place]        Today's Diagnoses     Insulin controlled gestational diabetes mellitus (GDM) in third trimester    -  1    Positive GBS test        Vaginal inclusion cyst        Supervision of high-risk pregnancy of elderly multigravida        37 weeks gestation of pregnancy        H/O  section           Follow-ups after your visit        Follow-up notes from your care team     Return in about 1 week (around 2018).      Your next 10 appointments already scheduled     2018  3:40 PM CST   US PELVIC COMPLETE W TRANSVAGINAL with WEUS2   [facility] ([facility])    [address] [place] [phone]           Please bring a list of your medicines (including vitamins, minerals and over-the-counter drugs). Also, tell your doctor about any allergies you may have. Wear comfortable clothes and leave your valuables at home.  Adults: Drink six 8-ounce glasses of fluid one hour before your exam. Do NOT empty your bladder.  If you need to empty your bladder before your exam, try to release only a little bit of urine. Then, drink another 8oz glass of fluid.  Children: Children who are potty trained should drink at least 4 cups (32 oz) of liquid 45 minutes to one hour prior to the exam. The child s bladder must be full in order to achieve a diagnostic exam. If your child is very uncomfortable or has an urgent need to pee, please notify a technologist; they will try to find out how much longer the wait may be and provide instructions to help relieve the pressure. Occasionally it is medically necessary to insert a urinary catheter to fill the bladder.   Please call the Imaging Department at your exam site with any questions.            Feb 12, 2018  4:20 PM CST   ESTABLISHED PRENATAL with Maria Ines Delong MD   [facility] ([facility] [place])    [address] [place] [address]  [place] [phone]              Who to contact     If you have questions or need follow up information about today's clinic visit or your schedule please contact [facility] directly at [phone].  Normal or non-critical lab and imaging results will be communicated to you by Ad[employer]art, letter or phone within 4 business days after the clinic has received the results. If you do not hear from us within 7 days, please contact the clinic through [employer] or phone. If you have a critical or abnormal lab result, we will notify you by phone as soon as possible.  Submit refill requests through Tripl or call your pharmacy and they will forward the refill request to us. Please allow 3 business days for your refill to be completed.          Additional Information About Your Visit        Tripl Information     Tripl gives you secure access to your electronic health record. If you see a primary care provider, you can also send messages to your care team and make appointments. If you have questions, please call your primary care clinic.  If you do not have a primary care provider, please call [phone] and they will assist you.        Care EveryWhere ID     This is your Care EveryWhere ID. This could be used by other organizations to access your [place] medical records  [id number]        Your Vitals Were     Last Period BMI (Body Mass Index)                05/18/2017 (Exact Date) 31.43 kg/m2           Blood Pressure from Last 3 Encounters:   02/05/18 122/78   01/29/18 112/74   01/25/18 130/80    Weight from Last 3 Encounters:   02/05/18 153 lb (69.4 kg)   01/29/18 153 lb (69.4 kg)   01/25/18 148 lb (67.1 kg)               Today, you had the following     No orders found for display       Primary Care Provider Office Phone # Fax #    [facility] [phone]       [facility] [address]  [place] [address]        Equal Access to Services     FLORENCE FREIRE : Hadii aad ku hadasho Soomaali, waaxda luqadaha, qaybta kaalmada adeegyada, waxay willin hayaan alexia shayshainaleelee walsh. So [facility] [phone].    ATENCIÓN: Si habla español, tiene a thompson disposición servicios gratuitos de asistencia lingüística. Jamia al [phone].    We comply with applicable federal civil rights laws and [place] laws. We do not discriminate on the basis of race, color, national origin, age, disability, sex, sexual orientation, or gender identity.            Thank you!     Thank you for choosing [facility] [place]  for your care. Our goal is always to provide you with excellent care. Hearing back from our patients is one way we can continue to improve our services. Please take a few minutes to complete the written survey that you may receive in the mail after your visit with us. Thank you!             Your Updated Medication List - Protect others around you: Learn how to safely use, store and throw away your medicines at www.disposemymeds.org.          This list is accurate as of 2/5/18  9:30 AM.  Always use your most recent med list.                   Brand Name Dispense Instructions for use Diagnosis    alcohol swab prep pads     100 each    Use to swab area of injection/lamar as directed.    Insulin controlled gestational diabetes mellitus (GDM) in third trimester       aspirin 81 MG tablet     90 tablet    Take 1 tablet (81 mg) by mouth daily    Supervision of high-risk pregnancy of elderly multigravida       blood glucose monitoring lancets     200 each    Use to test blood sugar 4 times daily or as directed.  Ok to substitute alternative if insurance  prefers.    Gestational diabetes       blood glucose monitoring test strip    ONETOUCH VERIO IQ    150 strip    Use to test blood sugars 4 times daily or as directed.    Gestational diabetes       insulin isophane human 100 UNIT/ML injection    HumuLIN N PEN    3 mL    5 units before bed    Insulin controlled gestational diabetes mellitus (GDM) in third trimester       insulin pen needle 32G X 4 MM    ULTICARE MICRO    100 each    Use 1 pen needles daily or as directed.    Insulin controlled gestational diabetes mellitus (GDM) in third trimester       prenatal multivitamin plus iron 27-0.8 MG Tabs per tablet      Take 1 tablet by mouth daily

## 2018-02-12 ENCOUNTER — PRENATAL OFFICE VISIT (OUTPATIENT)
Dept: OBGYN | Facility: CLINIC | Age: 38
End: 2018-02-12
Payer: COMMERCIAL

## 2018-02-12 ENCOUNTER — RADIANT APPOINTMENT (OUTPATIENT)
Dept: ULTRASOUND IMAGING | Facility: CLINIC | Age: 38
End: 2018-02-12
Payer: COMMERCIAL

## 2018-02-12 VITALS — WEIGHT: 155 LBS | SYSTOLIC BLOOD PRESSURE: 122 MMHG | DIASTOLIC BLOOD PRESSURE: 80 MMHG | BODY MASS INDEX: 31.84 KG/M2

## 2018-02-12 DIAGNOSIS — B95.1 POSITIVE GBS TEST: ICD-10-CM

## 2018-02-12 DIAGNOSIS — N89.8 VAGINAL INCLUSION CYST: ICD-10-CM

## 2018-02-12 DIAGNOSIS — Z86.32 HISTORY OF GESTATIONAL DIABETES IN PRIOR PREGNANCY, CURRENTLY PREGNANT: ICD-10-CM

## 2018-02-12 DIAGNOSIS — O09.529 SUPERVISION OF HIGH-RISK PREGNANCY OF ELDERLY MULTIGRAVIDA: ICD-10-CM

## 2018-02-12 DIAGNOSIS — O24.414 INSULIN CONTROLLED GESTATIONAL DIABETES MELLITUS (GDM) IN THIRD TRIMESTER: ICD-10-CM

## 2018-02-12 DIAGNOSIS — O09.299 HISTORY OF GESTATIONAL DIABETES IN PRIOR PREGNANCY, CURRENTLY PREGNANT: ICD-10-CM

## 2018-02-12 PROCEDURE — 76816 OB US FOLLOW-UP PER FETUS: CPT | Performed by: OBSTETRICS & GYNECOLOGY

## 2018-02-12 PROCEDURE — 76819 FETAL BIOPHYS PROFIL W/O NST: CPT | Performed by: OBSTETRICS & GYNECOLOGY

## 2018-02-12 PROCEDURE — 99207 ZZC PRENATAL VISIT: CPT | Performed by: OBSTETRICS & GYNECOLOGY

## 2018-02-12 NOTE — PATIENT INSTRUCTIONS
Your induction of labor has been scheduled for 7:30am on Thursday February 15th. Please call Labor and Delivery at 6:30am  [phone] to ensure all is well for you to come in.    Take only 6 units of the NPH the night before. Please have breakfast before coming in.

## 2018-02-12 NOTE — MR AVS SNAPSHOT
After Visit Summary   2/12/2018    Rosmery Garcia    MRN: [id number]           Patient Information     Date Of Birth          1980        Visit Information        Provider Department      2/12/2018 4:20 PM Maria Ines Delong MD [facility]        Today's Diagnoses     Positive GBS test        Vaginal inclusion cyst        Supervision of high-risk pregnancy of elderly multigravida        History of gestational diabetes in prior pregnancy, currently pregnant          Care Instructions    Your induction of labor has been scheduled for 7:30am on Thursday February 15th. Please call Labor and Delivery at 6:30am  [phone] to ensure all is well for you to come in.    Take only 6 units of the NPH the night before. Please have breakfast before coming in.          Follow-ups after your visit        Who to contact     If you have questions or need follow up information about today's clinic visit or your schedule please contact [facility] directly at [phone].  Normal or non-critical lab and imaging results will be communicated to you by [employer] americahart, letter or phone within 4 business days after the clinic has received the results. If you do not hear from us within 7 days, please contact the clinic through [employer] or phone. If you have a critical or abnormal lab result, we will notify you by phone as soon as possible.  Submit refill requests through [employer] or call your pharmacy and they will forward the refill request to us. Please allow 3 business days for your refill to be completed.          Additional Information About Your Visit        aXess americahart Information     [employer] gives you secure access to your electronic health record. If you see a primary care provider, you can also send messages to your care team and make appointments. If you have questions, please call your primary care clinic.  If you do not have a primary care provider, please call  [phone] and they will assist you.        Care EveryWhere ID     This is your Care EveryWhere ID. This could be used by other organizations to access your [place] medical records  [id number]        Your Vitals Were     Last Period Breastfeeding? BMI (Body Mass Index)             05/18/2017 (Exact Date) No 31.84 kg/m2          Blood Pressure from Last 3 Encounters:   02/12/18 122/80   02/05/18 122/78   01/29/18 112/74    Weight from Last 3 Encounters:   02/12/18 155 lb (70.3 kg)   02/05/18 153 lb (69.4 kg)   01/29/18 153 lb (69.4 kg)              Today, you had the following     No orders found for display       Primary Care Provider Office Phone # Fax #    [facility] [phone]       [place] [address]  [facility] [address]  [facility] [address]        Equal Access to Services     FLORENCE FREIRE : Hadii aad ku hadasho Soomaali, waaxda luqadaha, qaybta kaalmada adeegyada, waxay willin haykelsea baig . So [facility] [phone].    ATENCIÓN: Si habla español, tiene a thompson disposición servicios gratuitos de asistencia lingüística. Llame al [phone].    We comply with applicable federal civil rights laws and [place] laws. We do not discriminate on the basis of race, color, national origin, age, disability, sex, sexual orientation, or gender identity.            Thank you!     Thank you for choosing [facility]  for your care. Our goal is always to provide you with excellent care. Hearing back from our patients is one way we can continue to improve our services. Please take a few minutes to complete the written survey that you may receive in the mail after your visit with us. Thank you!             Your Updated Medication List - Protect others around you: Learn how to safely use, store and throw away your medicines at www.disposemymeds.org.          This list is accurate as of 2/12/18  5:09 PM.  Always use your most  recent med list.                   Brand Name Dispense Instructions for use Diagnosis    alcohol swab prep pads     100 each    Use to swab area of injection/lamar as directed.    Insulin controlled gestational diabetes mellitus (GDM) in third trimester       aspirin 81 MG tablet     90 tablet    Take 1 tablet (81 mg) by mouth daily    Supervision of high-risk pregnancy of elderly multigravida       blood glucose monitoring lancets     200 each    Use to test blood sugar 4 times daily or as directed.  Ok to substitute alternative if insurance prefers.    Gestational diabetes       blood glucose monitoring test strip    ONETOUCH VERIO IQ    150 strip    Use to test blood sugars 4 times daily or as directed.    Gestational diabetes       insulin isophane human 100 UNIT/ML injection    HumuLIN N PEN    3 mL    5 units before bed    Insulin controlled gestational diabetes mellitus (GDM) in third trimester       insulin pen needle 32G X 4 MM    ULTICARE MICRO    100 each    Use 1 pen needles daily or as directed.    Insulin controlled gestational diabetes mellitus (GDM) in third trimester       prenatal multivitamin plus iron 27-0.8 MG Tabs per tablet      Take 1 tablet by mouth daily

## 2018-02-12 NOTE — PROGRESS NOTES
Prenatal Visit:  Doing well. Intermittent contractions. No vaginal bleeding or LOF. Good fetal movement. Biophysical profile reassuring with cephalic presentation. Normal PITA.   Blood sugars reviewed. Only one PP elevated for post-breakfast, post-lunch and dinner.   Advised to use only 6 units of NPH the night before.  On SVE, small right sided Bartholin's cyst present. Non-painful. Vaginal inclusion cyst is still present.  Tight 1/50/-3 medium, mid position.  As Rosmery still desires a trial of labor after  section will proceed with IOL with cervical ripening balloon as Campbell score is 4 today.  Scheduled for 2/15/18 at 7:30am. Patient advised to call L&D at 6:30 am.  Maria Ines Delong MD

## 2018-02-13 RX ORDER — OXYTOCIN 10 [USP'U]/ML
10 INJECTION, SOLUTION INTRAMUSCULAR; INTRAVENOUS
Status: CANCELLED | OUTPATIENT
Start: 2018-02-13

## 2018-02-13 RX ORDER — SODIUM CHLORIDE, SODIUM LACTATE, POTASSIUM CHLORIDE, CALCIUM CHLORIDE 600; 310; 30; 20 MG/100ML; MG/100ML; MG/100ML; MG/100ML
INJECTION, SOLUTION INTRAVENOUS CONTINUOUS
Status: CANCELLED | OUTPATIENT
Start: 2018-02-13

## 2018-02-13 RX ORDER — NALOXONE HYDROCHLORIDE 0.4 MG/ML
.1-.4 INJECTION, SOLUTION INTRAMUSCULAR; INTRAVENOUS; SUBCUTANEOUS
Status: CANCELLED | OUTPATIENT
Start: 2018-02-13

## 2018-02-13 RX ORDER — TERBUTALINE SULFATE 1 MG/ML
0.25 INJECTION, SOLUTION SUBCUTANEOUS
Status: CANCELLED | OUTPATIENT
Start: 2018-02-13

## 2018-02-13 RX ORDER — OXYTOCIN/0.9 % SODIUM CHLORIDE 30/500 ML
100-340 PLASTIC BAG, INJECTION (ML) INTRAVENOUS CONTINUOUS PRN
Status: CANCELLED | OUTPATIENT
Start: 2018-02-13

## 2018-02-13 RX ORDER — METHYLERGONOVINE MALEATE 0.2 MG/ML
200 INJECTION INTRAVENOUS
Status: CANCELLED | OUTPATIENT
Start: 2018-02-13

## 2018-02-13 RX ORDER — ACETAMINOPHEN 325 MG/1
650 TABLET ORAL EVERY 4 HOURS PRN
Status: CANCELLED | OUTPATIENT
Start: 2018-02-13

## 2018-02-13 RX ORDER — ONDANSETRON 2 MG/ML
4 INJECTION INTRAMUSCULAR; INTRAVENOUS EVERY 6 HOURS PRN
Status: CANCELLED | OUTPATIENT
Start: 2018-02-13

## 2018-02-13 RX ORDER — OXYCODONE AND ACETAMINOPHEN 5; 325 MG/1; MG/1
1 TABLET ORAL
Status: CANCELLED | OUTPATIENT
Start: 2018-02-13

## 2018-02-13 RX ORDER — CARBOPROST TROMETHAMINE 250 UG/ML
250 INJECTION, SOLUTION INTRAMUSCULAR
Status: CANCELLED | OUTPATIENT
Start: 2018-02-13

## 2018-02-13 RX ORDER — PENICILLIN G POTASSIUM 5000000 [IU]/1
5 INJECTION, POWDER, FOR SOLUTION INTRAMUSCULAR; INTRAVENOUS ONCE
Status: CANCELLED | OUTPATIENT
Start: 2018-02-13 | End: 2018-02-13

## 2018-02-13 RX ORDER — IBUPROFEN 400 MG/1
800 TABLET, FILM COATED ORAL
Status: CANCELLED | OUTPATIENT
Start: 2018-02-13

## 2018-02-15 ENCOUNTER — HOSPITAL ENCOUNTER (INPATIENT)
Facility: CLINIC | Age: 38
LOS: 4 days | Discharge: HOME | End: 2018-02-19
Attending: OBSTETRICS & GYNECOLOGY | Admitting: OBSTETRICS & GYNECOLOGY
Payer: COMMERCIAL

## 2018-02-15 DIAGNOSIS — O14.93 PRE-ECLAMPSIA IN THIRD TRIMESTER: ICD-10-CM

## 2018-02-15 DIAGNOSIS — Z98.891 H/O CESAREAN SECTION: Primary | ICD-10-CM

## 2018-02-15 PROBLEM — O24.419 GESTATIONAL DIABETES MELLITUS: Status: ACTIVE | Noted: 2018-02-15

## 2018-02-15 LAB
ALBUMIN SERPL-MCNC: 2.3 G/DL (ref 3.4–5)
ALBUMIN SERPL-MCNC: 2.4 G/DL (ref 3.4–5)
ALP SERPL-CCNC: 194 U/L (ref 40–150)
ALP SERPL-CCNC: 196 U/L (ref 40–150)
ALT SERPL W P-5'-P-CCNC: 38 U/L (ref 0–50)
ALT SERPL W P-5'-P-CCNC: 41 U/L (ref 0–50)
ALT SERPL W P-5'-P-CCNC: 42 U/L (ref 0–50)
ANION GAP SERPL CALCULATED.3IONS-SCNC: 10 MMOL/L (ref 3–14)
ANION GAP SERPL CALCULATED.3IONS-SCNC: 9 MMOL/L (ref 3–14)
AST SERPL W P-5'-P-CCNC: 36 U/L (ref 0–45)
AST SERPL W P-5'-P-CCNC: 40 U/L (ref 0–45)
AST SERPL W P-5'-P-CCNC: 45 U/L (ref 0–45)
BASOPHILS # BLD AUTO: 0 10E9/L (ref 0–0.2)
BASOPHILS NFR BLD AUTO: 0.3 %
BILIRUB SERPL-MCNC: 0.6 MG/DL (ref 0.2–1.3)
BILIRUB SERPL-MCNC: 0.6 MG/DL (ref 0.2–1.3)
BUN SERPL-MCNC: 9 MG/DL (ref 7–30)
BUN SERPL-MCNC: 9 MG/DL (ref 7–30)
CALCIUM SERPL-MCNC: 8.1 MG/DL (ref 8.5–10.1)
CALCIUM SERPL-MCNC: 8.3 MG/DL (ref 8.5–10.1)
CHLORIDE SERPL-SCNC: 108 MMOL/L (ref 94–109)
CHLORIDE SERPL-SCNC: 108 MMOL/L (ref 94–109)
CO2 SERPL-SCNC: 20 MMOL/L (ref 20–32)
CO2 SERPL-SCNC: 23 MMOL/L (ref 20–32)
CREAT SERPL-MCNC: 0.6 MG/DL (ref 0.52–1.04)
CREAT SERPL-MCNC: 0.65 MG/DL (ref 0.52–1.04)
CREAT SERPL-MCNC: 0.69 MG/DL (ref 0.52–1.04)
CREAT UR-MCNC: 65 MG/DL
DIFFERENTIAL METHOD BLD: ABNORMAL
EOSINOPHIL # BLD AUTO: 0.1 10E9/L (ref 0–0.7)
EOSINOPHIL NFR BLD AUTO: 0.9 %
ERYTHROCYTE [DISTWIDTH] IN BLOOD BY AUTOMATED COUNT: 14.8 % (ref 10–15)
ERYTHROCYTE [DISTWIDTH] IN BLOOD BY AUTOMATED COUNT: 14.9 % (ref 10–15)
GFR SERPL CREATININE-BSD FRML MDRD: >90 ML/MIN/1.7M2
GLUCOSE BLDC GLUCOMTR-MCNC: 63 MG/DL (ref 70–99)
GLUCOSE BLDC GLUCOMTR-MCNC: 65 MG/DL (ref 70–99)
GLUCOSE BLDC GLUCOMTR-MCNC: 67 MG/DL (ref 70–99)
GLUCOSE BLDC GLUCOMTR-MCNC: 70 MG/DL (ref 70–99)
GLUCOSE BLDC GLUCOMTR-MCNC: 76 MG/DL (ref 70–99)
GLUCOSE BLDC GLUCOMTR-MCNC: 83 MG/DL (ref 70–99)
GLUCOSE SERPL-MCNC: 124 MG/DL (ref 70–99)
GLUCOSE SERPL-MCNC: 64 MG/DL (ref 70–99)
GLUCOSE SERPL-MCNC: 77 MG/DL (ref 70–99)
HCT VFR BLD AUTO: 34.7 % (ref 35–47)
HCT VFR BLD AUTO: 35.8 % (ref 35–47)
HCT VFR BLD AUTO: 35.9 % (ref 35–47)
HCT VFR BLD AUTO: 36.5 % (ref 35–47)
HGB BLD-MCNC: 12.2 G/DL (ref 11.7–15.7)
HGB BLD-MCNC: 12.6 G/DL (ref 11.7–15.7)
HGB BLD-MCNC: 12.8 G/DL (ref 11.7–15.7)
HGB BLD-MCNC: 13 G/DL (ref 11.7–15.7)
IMM GRANULOCYTES # BLD: 0.1 10E9/L (ref 0–0.4)
IMM GRANULOCYTES NFR BLD: 0.5 %
LYMPHOCYTES # BLD AUTO: 1.4 10E9/L (ref 0.8–5.3)
LYMPHOCYTES NFR BLD AUTO: 15.5 %
MCH RBC QN AUTO: 31.9 PG (ref 26.5–33)
MCH RBC QN AUTO: 31.9 PG (ref 26.5–33)
MCH RBC QN AUTO: 32.3 PG (ref 26.5–33)
MCH RBC QN AUTO: 32.4 PG (ref 26.5–33)
MCHC RBC AUTO-ENTMCNC: 35.1 G/DL (ref 31.5–36.5)
MCHC RBC AUTO-ENTMCNC: 35.2 G/DL (ref 31.5–36.5)
MCHC RBC AUTO-ENTMCNC: 35.6 G/DL (ref 31.5–36.5)
MCHC RBC AUTO-ENTMCNC: 35.8 G/DL (ref 31.5–36.5)
MCV RBC AUTO: 91 FL (ref 78–100)
MONOCYTES # BLD AUTO: 0.6 10E9/L (ref 0–1.3)
MONOCYTES NFR BLD AUTO: 6.8 %
NEUTROPHILS # BLD AUTO: 7 10E9/L (ref 1.6–8.3)
NEUTROPHILS NFR BLD AUTO: 76 %
NRBC # BLD AUTO: 0 10*3/UL
NRBC BLD AUTO-RTO: 0 /100
PLATELET # BLD AUTO: 67 10E9/L (ref 150–450)
PLATELET # BLD AUTO: 69 10E9/L (ref 150–450)
PLATELET # BLD AUTO: 70 10E9/L (ref 150–450)
PLATELET # BLD AUTO: 71 10E9/L (ref 150–450)
POTASSIUM SERPL-SCNC: 4 MMOL/L (ref 3.4–5.3)
POTASSIUM SERPL-SCNC: 4.4 MMOL/L (ref 3.4–5.3)
PROT SERPL-MCNC: 6.4 G/DL (ref 6.8–8.8)
PROT SERPL-MCNC: 6.7 G/DL (ref 6.8–8.8)
PROT UR-MCNC: 0.35 G/L
PROT/CREAT 24H UR: 0.55 G/G CR (ref 0–0.2)
RBC # BLD AUTO: 3.83 10E12/L (ref 3.8–5.2)
RBC # BLD AUTO: 3.95 10E12/L (ref 3.8–5.2)
RBC # BLD AUTO: 3.95 10E12/L (ref 3.8–5.2)
RBC # BLD AUTO: 4.03 10E12/L (ref 3.8–5.2)
SODIUM SERPL-SCNC: 138 MMOL/L (ref 133–144)
SODIUM SERPL-SCNC: 140 MMOL/L (ref 133–144)
T PALLIDUM IGG+IGM SER QL: NEGATIVE
URATE SERPL-MCNC: 4.4 MG/DL (ref 2.6–6)
WBC # BLD AUTO: 8.8 10E9/L (ref 4–11)
WBC # BLD AUTO: 9 10E9/L (ref 4–11)
WBC # BLD AUTO: 9.2 10E9/L (ref 4–11)
WBC # BLD AUTO: 9.9 10E9/L (ref 4–11)

## 2018-02-15 PROCEDURE — 36415 COLL VENOUS BLD VENIPUNCTURE: CPT | Performed by: OBSTETRICS & GYNECOLOGY

## 2018-02-15 PROCEDURE — 82565 ASSAY OF CREATININE: CPT | Performed by: OBSTETRICS & GYNECOLOGY

## 2018-02-15 PROCEDURE — 85025 COMPLETE CBC W/AUTO DIFF WBC: CPT | Performed by: OBSTETRICS & GYNECOLOGY

## 2018-02-15 PROCEDURE — 25000128 H RX IP 250 OP 636: Performed by: OBSTETRICS & GYNECOLOGY

## 2018-02-15 PROCEDURE — 86780 TREPONEMA PALLIDUM: CPT | Performed by: OBSTETRICS & GYNECOLOGY

## 2018-02-15 PROCEDURE — 84450 TRANSFERASE (AST) (SGOT): CPT | Performed by: OBSTETRICS & GYNECOLOGY

## 2018-02-15 PROCEDURE — 86900 BLOOD TYPING SEROLOGIC ABO: CPT | Performed by: OBSTETRICS & GYNECOLOGY

## 2018-02-15 PROCEDURE — 84460 ALANINE AMINO (ALT) (SGPT): CPT | Performed by: OBSTETRICS & GYNECOLOGY

## 2018-02-15 PROCEDURE — 00000146 ZZHCL STATISTIC GLUCOSE BY METER IP

## 2018-02-15 PROCEDURE — 80053 COMPREHEN METABOLIC PANEL: CPT | Performed by: OBSTETRICS & GYNECOLOGY

## 2018-02-15 PROCEDURE — 82947 ASSAY GLUCOSE BLOOD QUANT: CPT | Performed by: OBSTETRICS & GYNECOLOGY

## 2018-02-15 PROCEDURE — 85027 COMPLETE CBC AUTOMATED: CPT | Performed by: OBSTETRICS & GYNECOLOGY

## 2018-02-15 PROCEDURE — 86901 BLOOD TYPING SEROLOGIC RH(D): CPT | Performed by: OBSTETRICS & GYNECOLOGY

## 2018-02-15 PROCEDURE — 10907ZC DRAINAGE OF AMNIOTIC FLUID, THERAPEUTIC FROM PRODUCTS OF CONCEPTION, VIA NATURAL OR ARTIFICIAL OPENING: ICD-10-PCS | Performed by: OBSTETRICS & GYNECOLOGY

## 2018-02-15 PROCEDURE — 84156 ASSAY OF PROTEIN URINE: CPT | Performed by: OBSTETRICS & GYNECOLOGY

## 2018-02-15 PROCEDURE — 12000031 ZZH R&B OB CRITICAL

## 2018-02-15 PROCEDURE — 84550 ASSAY OF BLOOD/URIC ACID: CPT | Performed by: OBSTETRICS & GYNECOLOGY

## 2018-02-15 PROCEDURE — 86850 RBC ANTIBODY SCREEN: CPT | Performed by: OBSTETRICS & GYNECOLOGY

## 2018-02-15 PROCEDURE — 86923 COMPATIBILITY TEST ELECTRIC: CPT | Performed by: OBSTETRICS & GYNECOLOGY

## 2018-02-15 PROCEDURE — 25000125 ZZHC RX 250: Performed by: OBSTETRICS & GYNECOLOGY

## 2018-02-15 RX ORDER — DEXTROSE, SODIUM CHLORIDE, SODIUM LACTATE, POTASSIUM CHLORIDE, AND CALCIUM CHLORIDE 5; .6; .31; .03; .02 G/100ML; G/100ML; G/100ML; G/100ML; G/100ML
INJECTION, SOLUTION INTRAVENOUS CONTINUOUS
Status: DISCONTINUED | OUTPATIENT
Start: 2018-02-15 | End: 2018-02-16

## 2018-02-15 RX ORDER — LIDOCAINE 40 MG/G
CREAM TOPICAL
Status: DISCONTINUED | OUTPATIENT
Start: 2018-02-15 | End: 2018-02-16

## 2018-02-15 RX ORDER — OXYCODONE AND ACETAMINOPHEN 5; 325 MG/1; MG/1
1 TABLET ORAL
Status: DISCONTINUED | OUTPATIENT
Start: 2018-02-15 | End: 2018-02-16

## 2018-02-15 RX ORDER — PENICILLIN G POTASSIUM 5000000 [IU]/1
5 INJECTION, POWDER, FOR SOLUTION INTRAMUSCULAR; INTRAVENOUS ONCE
Status: COMPLETED | OUTPATIENT
Start: 2018-02-15 | End: 2018-02-15

## 2018-02-15 RX ORDER — OXYTOCIN/0.9 % SODIUM CHLORIDE 30/500 ML
100-340 PLASTIC BAG, INJECTION (ML) INTRAVENOUS CONTINUOUS PRN
Status: DISCONTINUED | OUTPATIENT
Start: 2018-02-15 | End: 2018-02-16

## 2018-02-15 RX ORDER — ONDANSETRON 2 MG/ML
4 INJECTION INTRAMUSCULAR; INTRAVENOUS EVERY 6 HOURS PRN
Status: DISCONTINUED | OUTPATIENT
Start: 2018-02-15 | End: 2018-02-16

## 2018-02-15 RX ORDER — NICOTINE POLACRILEX 4 MG
15-30 LOZENGE BUCCAL
Status: DISCONTINUED | OUTPATIENT
Start: 2018-02-15 | End: 2018-02-16

## 2018-02-15 RX ORDER — METHYLERGONOVINE MALEATE 0.2 MG/ML
200 INJECTION INTRAVENOUS
Status: DISCONTINUED | OUTPATIENT
Start: 2018-02-15 | End: 2018-02-16

## 2018-02-15 RX ORDER — OXYTOCIN 10 [USP'U]/ML
10 INJECTION, SOLUTION INTRAMUSCULAR; INTRAVENOUS
Status: DISCONTINUED | OUTPATIENT
Start: 2018-02-15 | End: 2018-02-16

## 2018-02-15 RX ORDER — IBUPROFEN 400 MG/1
800 TABLET, FILM COATED ORAL
Status: DISCONTINUED | OUTPATIENT
Start: 2018-02-15 | End: 2018-02-16

## 2018-02-15 RX ORDER — SODIUM CHLORIDE, SODIUM LACTATE, POTASSIUM CHLORIDE, CALCIUM CHLORIDE 600; 310; 30; 20 MG/100ML; MG/100ML; MG/100ML; MG/100ML
INJECTION, SOLUTION INTRAVENOUS CONTINUOUS
Status: DISCONTINUED | OUTPATIENT
Start: 2018-02-15 | End: 2018-02-16

## 2018-02-15 RX ORDER — OXYTOCIN/0.9 % SODIUM CHLORIDE 30/500 ML
1-24 PLASTIC BAG, INJECTION (ML) INTRAVENOUS CONTINUOUS
Status: DISCONTINUED | OUTPATIENT
Start: 2018-02-15 | End: 2018-02-16

## 2018-02-15 RX ORDER — SODIUM CHLORIDE 9 MG/ML
INJECTION, SOLUTION INTRAVENOUS CONTINUOUS
Status: DISCONTINUED | OUTPATIENT
Start: 2018-02-15 | End: 2018-02-16

## 2018-02-15 RX ORDER — TERBUTALINE SULFATE 1 MG/ML
0.25 INJECTION, SOLUTION SUBCUTANEOUS
Status: DISCONTINUED | OUTPATIENT
Start: 2018-02-15 | End: 2018-02-16

## 2018-02-15 RX ORDER — NALOXONE HYDROCHLORIDE 0.4 MG/ML
.1-.4 INJECTION, SOLUTION INTRAMUSCULAR; INTRAVENOUS; SUBCUTANEOUS
Status: DISCONTINUED | OUTPATIENT
Start: 2018-02-15 | End: 2018-02-16

## 2018-02-15 RX ORDER — DEXTROSE MONOHYDRATE 25 G/50ML
25-50 INJECTION, SOLUTION INTRAVENOUS
Status: DISCONTINUED | OUTPATIENT
Start: 2018-02-15 | End: 2018-02-16

## 2018-02-15 RX ORDER — CARBOPROST TROMETHAMINE 250 UG/ML
250 INJECTION, SOLUTION INTRAMUSCULAR
Status: DISCONTINUED | OUTPATIENT
Start: 2018-02-15 | End: 2018-02-16

## 2018-02-15 RX ORDER — ACETAMINOPHEN 325 MG/1
650 TABLET ORAL EVERY 4 HOURS PRN
Status: DISCONTINUED | OUTPATIENT
Start: 2018-02-15 | End: 2018-02-16

## 2018-02-15 RX ADMIN — PENICILLIN G POTASSIUM 5 MILLION UNITS: 5000000 POWDER, FOR SOLUTION INTRAMUSCULAR; INTRAPLEURAL; INTRATHECAL; INTRAVENOUS at 19:43

## 2018-02-15 RX ADMIN — SODIUM CHLORIDE, POTASSIUM CHLORIDE, SODIUM LACTATE AND CALCIUM CHLORIDE: 600; 310; 30; 20 INJECTION, SOLUTION INTRAVENOUS at 13:48

## 2018-02-15 RX ADMIN — OXYTOCIN-SODIUM CHLORIDE 0.9% IV SOLN 30 UNIT/500ML 2 MILLI-UNITS/MIN: 30-0.9/5 SOLUTION at 19:57

## 2018-02-15 RX ADMIN — SODIUM CHLORIDE, SODIUM LACTATE, POTASSIUM CHLORIDE, CALCIUM CHLORIDE, AND DEXTROSE MONOHYDRATE: 600; 310; 30; 20; 5 INJECTION, SOLUTION INTRAVENOUS at 18:31

## 2018-02-15 NOTE — IP AVS SNAPSHOT
[address]    [place] [address]    Phone:  [phone]                                       After Visit Summary   2/15/2018    Rosmery Garcia    MRN: [id number]           After Visit Summary Signature Page     I have received my discharge instructions, and my questions have been answered. I have discussed any challenges I see with this plan with the nurse or doctor.    ..........................................................................................................................................  Patient/Patient Representative Signature      ..........................................................................................................................................  Patient Representative Print Name and Relationship to Patient    ..................................................               ................................................  Date                                            Time    ..........................................................................................................................................  Reviewed by Signature/Title    ...................................................              ..............................................  Date                                                            Time

## 2018-02-15 NOTE — IP AVS SNAPSHOT
MRN:[id number]                      After Visit Summary   2/15/2018    Rosmery Garcia    MRN: [id number]           Thank you!     Thank you for choosing [facility] for your care. Our goal is always to provide you with excellent care. Hearing back from our patients is one way we can continue to improve our services. Please take a few minutes to complete the written survey that you may receive in the mail after you visit with us. Thank you!        Patient Information     Date Of Birth          1980        About your hospital stay     You were admitted on:  February 15, 2018 You last received care in the:  [address]    You were discharged on:  February 19, 2018        Reason for your hospital stay       Maternity care                  Who to Call     For medical emergencies, please call 911.  For non-urgent questions about your medical care, please call your primary care provider or clinic, [phone]  For questions related to your surgery, please call your surgery clinic        Attending Provider     Provider Maria Ines King MD OB/Gyn       Primary Care Provider Office Phone # Fax #    [facility] [phone]      After Care Instructions     Activity       Review discharge instructions            Diet       Resume previous diet            Discharge Instructions - Gestational diabetic patients       Gestational diabetic patients to follow up for fasting blood sugar and 2 hour 75gm glucose load at 6 weeks postpartum.            Discharge Instructions - Hypertensive disorders patients       High Blood pressure patients to follow up in clinic or via home care within one week for a blood pressure check            Discharge Instructions - Postpartum visit       Schedule postpartum visit with your provider and return to clinic in 6 weeks.                  Follow-up Appointments     Follow Up and  recommended labs and tests       2 Hr GTT at 6 week visit                  Further instructions from your care team         Discharge Instructions for  Section ()  You had a  section, or . During the , your baby was delivered through a surgical incision in your stomach and uterus. Full recovery after a  can take time. It s important to take care of yourself -- for your own sake and because your new baby needs you. Here are some guidelines to follow at home.  Incision care  Here's how to take care of your incision:    Shower as needed. Pat your incision dry.    Watch your incision for signs of infection, like increasing redness or drainage.    Hold a pillow against the incision when you laugh or cough and when you get up from a lying or sitting position.    Remember, it can take as long as 6 weeks for a  incision to heal.  Activity  Here are some suggestions:    Don t try to take care of anyone other than your baby and yourself.    Remember, the more active you are, the more likely you are to have an increase in your bleeding.    Get lots of rest. Take naps in the afternoon.    Increase your activities gradually.    Plan your activities so that you don t have to go up or down stairs more than necessary.    Do postsurgical deep breathing and coughing exercises. Ask your healthcare provider for instructions.    Don t lift anything heavier than your baby until your healthcare provider tells you it s OK.    Don t drive until your healthcare provider says it s OK.    Don t have sexual intercourse until after you ve had a follow-up appointment with your healthcare provider and you have decided on a birth control method.  Follow-up  Make a follow-up appointment as directed by our staff.  When to call your healthcare provider  Call your healthcare provider right away if you have any of the following:    Fever of 100.4 F (38 C) or higher    Redness, pain, or  "drainage at your incision site    Bleeding that requires a new sanitary pad every hour    Severe pain in the abdomen    Pain or urgency with urination    Foul odor from vaginal discharge    Trouble urinating or emptying your bladder    No bowel movement within 1 week after the birth of your baby    Swollen, red, painful area in the leg    Appearance of rash or hives    Sore, red, painful area on the breasts that may be accompanied by flu-like symptoms    Feelings of anxiety, panic, and/or depression   Date Last Reviewed: 8/16/2015 2000-2017 [employer]. [address] [place] [address]. All rights reserved. This information is not intended as a substitute for professional medical care. Always follow your healthcare professional's instructions.          Pending Results     No orders found from 2/13/2018 to 2/16/2018.            Statement of Approval     Ordered          02/19/18 0903  I have reviewed and agree with all the recommendations and orders detailed in this document.  EFFECTIVE NOW     Approved and electronically signed by:  Maria Ines Delong MD             Admission Information     Date & Time Provider Department Dept. Phone    2/15/2018 Maria Ines Delong MD [address] [phone]      Your Vitals Were     Blood Pressure Pulse Temperature Respirations Height Weight    134/88 83 98.7  F (37.1  C) (Oral) 16 1.499 m (4' 11\") 63.1 kg (139 lb 3.2 oz)    Last Period Pulse Oximetry BMI (Body Mass Index)             05/18/2017 (Exact Date) 99% 28.11 kg/m2         MyChart Information     [employer] gives you secure access to your electronic health record. If you see a primary care provider, you can also send messages to your care team and make appointments. If you have questions, please call your primary care clinic.  If you do not have a primary care provider, please call [phone] and they will assist you.        Care EveryWhere ID     " This is your Care EveryWhere ID. This could be used by other organizations to access your [place] medical records  [id number]        Equal Access to Services     FLORENCE FREIRE : Caitlin Tinoco, racheal frank, geenayariel castronabilbri tuckerstefan, waxke willin hayaaeleanor tuckerpat ocasio lasnehaleleanor walsh. So [facility] [phone].    ATENCIÓN: Si habla español, tiene a thompson disposición servicios gratuitos de asistencia lingüística. Llame al [phone].    We comply with applicable federal civil rights laws and [place] laws. We do not discriminate on the basis of race, color, national origin, age, disability, sex, sexual orientation, or gender identity.               Review of your medicines      START taking        Dose / Directions    NIFEdipine ER 30 MG Tb24   Commonly known as:  ADALAT CC   Used for:  Pre-eclampsia in third trimester        Dose:  30 mg   Start taking on:  2018   Take 1 tablet (30 mg) by mouth daily   Quantity:  30 tablet   Refills:  1       oxyCODONE IR 5 MG tablet   Commonly known as:  ROXICODONE   Used for:  H/O  section        Dose:  5 mg   Take 1 tablet (5 mg) by mouth every 4 hours as needed for other (pain control or improvement in physical function. Hold dose for analgesic side effects.)   Quantity:  30 tablet   Refills:  0         CONTINUE these medicines which have NOT CHANGED        Dose / Directions    alcohol swab prep pads   Used for:  Insulin controlled gestational diabetes mellitus (GDM) in third trimester        Use to swab area of injection/lamar as directed.   Quantity:  100 each   Refills:  0       blood glucose monitoring lancets   Used for:  Gestational diabetes        Use to test blood sugar 4 times daily or as directed.  Ok to substitute alternative if insurance prefers.   Quantity:  200 each   Refills:  6       blood glucose monitoring test strip   Commonly known as:  ONETOUCH VERIO IQ   Used for:  Gestational diabetes        Use to test blood sugars 4 times daily or as  directed.   Quantity:  150 strip   Refills:  6       insulin pen needle 32G X 4 MM   Commonly known as:  ULTICARE MICRO   Used for:  Insulin controlled gestational diabetes mellitus (GDM) in third trimester        Use 1 pen needles daily or as directed.   Quantity:  100 each   Refills:  0       prenatal multivitamin plus iron 27-0.8 MG Tabs per tablet        Dose:  1 tablet   Take 1 tablet by mouth daily   Refills:  0         STOP taking     aspirin 81 MG tablet           insulin isophane human 100 UNIT/ML injection   Commonly known as:  HumuLIN N PEN                Where to get your medicines      These medications were sent to [facility] [place], [place] - [address]  [address], [place] [address]     Phone:  [phone]     NIFEdipine ER 30 MG Tb24         Some of these will need a paper prescription and others can be bought over the counter. Ask your nurse if you have questions.     Bring a paper prescription for each of these medications     oxyCODONE IR 5 MG tablet                Protect others around you: Learn how to safely use, store and throw away your medicines at www.disposemymeds.org.        Information about OPIOIDS     PRESCRIPTION OPIOIDS: WHAT YOU NEED TO KNOW    Prescription opioids can be used to help relieve moderate to severe pain and are often prescribed following a surgery or injury, or for certain health conditions. These medications can be an important part of treatment but also come with serious risks. It is important to work with your health care provider to make sure you are getting the safest, most effective care.    WHAT ARE THE RISKS AND SIDE EFFECTS OF OPIOID USE?  Prescription opioids carry serious risks of addiction and overdose, especially with prolonged use. An opioid overdose, often marked by slowed breathing can cause sudden death. The use of prescription opioids can have a number of side effects as well, even when taken as  directed:      Tolerance - meaning you might need to take more of a medication for the same pain relief    Physical dependence - meaning you have symptoms of withdrawal when a medication is stopped    Increased sensitivity to pain    Constipation    Nausea, vomiting, and dry mouth    Sleepiness and dizziness    Confusion    Depression    Low levels of testosterone that can result in lower sex drive, energy, and strength    Itching and sweating    RISKS ARE GREATER WITH:    History of drug misuse, substance use disorder, or overdose    Mental health conditions (such as depression or anxiety)    Sleep apnea    Older age (65 years or older)    Pregnancy    Avoid alcohol while taking prescription opioids.   Also, unless specifically advised by your health care provider, medications to avoid include:    Benzodiazepines (such as Xanax or Valium)    Muscle relaxants (such as Soma or Flexeril)    Hypnotics (such as Ambien or Lunesta)    Other prescription opioids    KNOW YOUR OPTIONS:  Talk to your health care provider about ways to manage your pain that do not involve prescription opioids. Some of these options may actually work better and have fewer risks and side effects:    Pain relievers such as acetaminophen, ibuprofen, and naproxen    Some medications that are also used for depression or seizures    Physical therapy and exercise    Cognitive behavioral therapy, a psychological, goal-directed approach, in which patients learn how to modify physical, behavioral, and emotional triggers of pain and stress    IF YOU ARE PRESCRIBED OPIOIDS FOR PAIN:    Never take opioids in greater amounts or more often than prescribed    Follow up with your primary health care provider and work together to create a plan on how to manage your pain.    Talk about ways to help manage your pain that do not involve prescription opioids    Talk about all concerns and side effects    Help prevent misuse and abuse    Never sell or share  prescription opioids    Never use another person's prescription opioids    Store prescription opioids in a secure place and out of reach of others (this may include visitors, children, friends, and family)    Visit www.cdc.gov/drugoverdose to learn about risks of opioid abuse and overdose    If you believe you may be struggling with addiction, tell your health care provider and ask for guidance or call [facility]'s National Helpline at 3-817-519-HELP    LEARN MORE / www.cdc.gov/drugoverdose/prescribing/guideline.html    Safely dispose of unused prescription opioids: Find your local drug take-back programs and more information about the importance of safe disposal at www.doseofreality.[place].gov             Medication List: This is a list of all your medications and when to take them. Check marks below indicate your daily home schedule. Keep this list as a reference.      Medications           Morning Afternoon Evening Bedtime As Needed    alcohol swab prep pads   Use to swab area of injection/lamar as directed.                                blood glucose monitoring lancets   Use to test blood sugar 4 times daily or as directed.  Ok to substitute alternative if insurance prefers.                                blood glucose monitoring test strip   Commonly known as:  ONETOUCH VERIO IQ   Use to test blood sugars 4 times daily or as directed.                                insulin pen needle 32G X 4 MM   Commonly known as:  ULTICARE MICRO   Use 1 pen needles daily or as directed.                                NIFEdipine ER 30 MG Tb24   Commonly known as:  ADALAT CC   Take 1 tablet (30 mg) by mouth daily   Start taking on:  2/20/2018   Last time this was given:  30 mg on 2/19/2018  8:09 AM                                oxyCODONE IR 5 MG tablet   Commonly known as:  ROXICODONE   Take 1 tablet (5 mg) by mouth every 4 hours as needed for other (pain control or improvement in physical function. Hold dose for analgesic side  effects.)   Last time this was given:  5 mg on 2/19/2018 12:02 AM                                prenatal multivitamin plus iron 27-0.8 MG Tabs per tablet   Take 1 tablet by mouth daily

## 2018-02-15 NOTE — H&P
"Significant change in general health status.  See prenatal record and notation in the progress note.  Rosmery is a 38 yo  at 39w0d for IOL for GDM on insulin. She has a history of a prior low transverse  section. She presented to labor and delivery with elevated blood pressure and no symptoms.    Vital signs:      BP: (!) 145/94     Resp: 16       Height: 149.9 cm (4' 11\") Weight: 68 kg (150 lb)  Estimated body mass index is 30.3 kg/(m^2) as calculated from the following:    Height as of this encounter: 1.499 m (4' 11\").    Weight as of this encounter: 68 kg (150 lb).    GEN: NAD  CV:RRR  RESP: CTAB  GI: soft, gravid. No Epigastric pain  SVE: 0.5/50/-3  Cook balloon placed with 30/30ml    Lab Results   Component Value Date    WBC 9.9 02/15/2018     Lab Results   Component Value Date    RBC 3.95 02/15/2018     Lab Results   Component Value Date    HGB 12.6 02/15/2018     Lab Results   Component Value Date    HCT 35.9 02/15/2018     No components found for: MCT  Lab Results   Component Value Date    MCV 91 02/15/2018     Lab Results   Component Value Date    MCH 31.9 02/15/2018     Lab Results   Component Value Date    MCHC 35.1 02/15/2018     Lab Results   Component Value Date    RDW 14.8 02/15/2018     Lab Results   Component Value Date    PLT 70 02/15/2018     Results for ROSMERY YO (MRN [id number]) as of 2/15/2018 12:02   Ref. Range 2/15/2018 10:20   Creatinine Latest Ref Range: 0.52 - 1.04 mg/dL 0.69   GFR Estimate Latest Ref Range: >60 mL/min/1.7m2 >90   GFR Estimate If Black Latest Ref Range: >60 mL/min/1.7m2 >90   Calcium Latest Ref Range: 8.5 - 10.1 mg/dL 8.3 (L)   Anion Gap Latest Ref Range: 3 - 14 mmol/L 9   Albumin Latest Ref Range: 3.4 - 5.0 g/dL 2.4 (L)   Protein Total Latest Ref Range: 6.8 - 8.8 g/dL 6.7 (L)   Bilirubin Total Latest Ref Range: 0.2 - 1.3 mg/dL 0.6   Alkaline Phosphatase Latest Ref Range: 40 - 150 U/L 196 (H)   ALT Latest Ref Range: 0 - 50 U/L 42   AST Latest " Ref Range: 0 - 45 U/L 45   Urine Protein/Creatinine Ratio 0.55    Assessment: 36 yo  at 39w0d TOLAC, GDM on insulin and Preeclampsia with thrombocytopenia    Plan:  I discussed with Rosmery and Alvaro that with preeclampsia, an immediate  section is an option at this point but they both declined due to the need for it to be under general anesthesia.   They were counseled that the preeclampsia does not allow us the length of time that this induction will take. The risks of preeclampsia of stroke, seizure, maternal and fetal distress were discussed and they both would like to try for induction at this time.  Cook balloon placed as above  Repeat labs at 1600  Monitor BP closely. Will start magnesium if blood pressures enter into severe range.  She was counseled that we would proceed with a  section if her labs worsen an if her blood pressures enter into severe range and are not able to be controlled with treatment. We would also proceed with  section for any other fetal or maternal indication.  I did  them that the chances for a vaginal delivery are low but we can start the induction at this time as there is not need for an emergent delivery at this moment.    EFW: 6lb 8oz     Maria Ines Delong MD

## 2018-02-15 NOTE — PLAN OF CARE
"2/15/18- 0830- Pt presents to L&D for TOLAC starting with a omer balloon per Dr Muse-Hx of preeclampsia first C/S, GDIC, GBS+- EFM applied for reactive NST- /107-140/90 on arrival will recheck. - 2hr PP- IV SL started. Spouse at bedside. Pt states\" I have been taking an antibiotic for my urine for 2 months\"- RX unknown.  1030- /95- denies s/sx of preclampsia- PLT 69- Lab results called to Dr Otero-NON to repeat CBC with plt-CMP & urine random protien.  1200- Dr Martino here discussing POC with pt/spouse regarding TOLAC/Preeclampsia/GDIC/ omer bulb vs. C/S.  1230- pt/spouse have been informed of all risk factors - family have decided on a TOLAC at this time-omer bulb placed for mechanical ripening by Dr Delong- Dr Ceballos informed of pt status  And PLT's of 70- labs will be repeated at 1600. Education regarding pt's high risk factors reinforced by this RN.  Dawood/Mady informed of patient status at this time.  1400- BG 63-sips of apple juice given-asymptomatic.  1430-/79 on left side- denies s/sx of preclampsia.   1500- Patients sister in law has requested a  for Rosmery stating \" My brother is a hot head and my sister in law would feel better with an  to understand the POC more clearly.   services called immediately.    1550- Spouse wants  cancelled because he will pay out of pocket, informed spouse that is part of her care and addressing the issue of a language barrier is important. Patient speaks English but Taiwanese is her primary language, Dr Delong aware of request, states \"pt has never used an  in office for prenatal care\".  1815-  at bedside.  BG 67- D5LR started at 100/hr.  "

## 2018-02-16 ENCOUNTER — ANESTHESIA EVENT (OUTPATIENT)
Dept: OBGYN | Facility: CLINIC | Age: 38
End: 2018-02-16
Payer: COMMERCIAL

## 2018-02-16 ENCOUNTER — ANESTHESIA (OUTPATIENT)
Dept: OBGYN | Facility: CLINIC | Age: 38
End: 2018-02-16
Payer: COMMERCIAL

## 2018-02-16 ENCOUNTER — SURGERY (OUTPATIENT)
Age: 38
End: 2018-02-16

## 2018-02-16 LAB
ABO + RH BLD: NORMAL
ABO + RH BLD: NORMAL
ALT SERPL W P-5'-P-CCNC: 33 U/L (ref 0–50)
ALT SERPL W P-5'-P-CCNC: 38 U/L (ref 0–50)
AST SERPL W P-5'-P-CCNC: 34 U/L (ref 0–45)
AST SERPL W P-5'-P-CCNC: 34 U/L (ref 0–45)
BLD GP AB SCN SERPL QL: NORMAL
BLD PROD TYP BPU: NORMAL
BLD PROD TYP BPU: NORMAL
BLD UNIT ID BPU: 0
BLD UNIT ID BPU: 0
BLOOD BANK CMNT PATIENT-IMP: NORMAL
BLOOD PRODUCT CODE: NORMAL
BLOOD PRODUCT CODE: NORMAL
BPU ID: NORMAL
BPU ID: NORMAL
CREAT SERPL-MCNC: 0.59 MG/DL (ref 0.52–1.04)
CREAT SERPL-MCNC: 0.66 MG/DL (ref 0.52–1.04)
ERYTHROCYTE [DISTWIDTH] IN BLOOD BY AUTOMATED COUNT: 14.8 % (ref 10–15)
ERYTHROCYTE [DISTWIDTH] IN BLOOD BY AUTOMATED COUNT: 14.9 % (ref 10–15)
GFR SERPL CREATININE-BSD FRML MDRD: >90 ML/MIN/1.7M2
GFR SERPL CREATININE-BSD FRML MDRD: >90 ML/MIN/1.7M2
GLUCOSE BLDC GLUCOMTR-MCNC: 111 MG/DL (ref 70–99)
GLUCOSE BLDC GLUCOMTR-MCNC: 113 MG/DL (ref 70–99)
GLUCOSE BLDC GLUCOMTR-MCNC: 120 MG/DL (ref 70–99)
GLUCOSE BLDC GLUCOMTR-MCNC: 122 MG/DL (ref 70–99)
GLUCOSE BLDC GLUCOMTR-MCNC: 123 MG/DL (ref 70–99)
GLUCOSE BLDC GLUCOMTR-MCNC: 125 MG/DL (ref 70–99)
GLUCOSE BLDC GLUCOMTR-MCNC: 128 MG/DL (ref 70–99)
GLUCOSE BLDC GLUCOMTR-MCNC: 129 MG/DL (ref 70–99)
GLUCOSE BLDC GLUCOMTR-MCNC: 131 MG/DL (ref 70–99)
GLUCOSE BLDC GLUCOMTR-MCNC: 90 MG/DL (ref 70–99)
GLUCOSE BLDC GLUCOMTR-MCNC: 91 MG/DL (ref 70–99)
GLUCOSE BLDC GLUCOMTR-MCNC: 93 MG/DL (ref 70–99)
HCT VFR BLD AUTO: 37.4 % (ref 35–47)
HCT VFR BLD AUTO: 37.7 % (ref 35–47)
HGB BLD-MCNC: 13.4 G/DL (ref 11.7–15.7)
HGB BLD-MCNC: 13.4 G/DL (ref 11.7–15.7)
KETONES BLD-SCNC: 0.8 MMOL/L (ref 0–0.6)
MCH RBC QN AUTO: 32.3 PG (ref 26.5–33)
MCH RBC QN AUTO: 32.4 PG (ref 26.5–33)
MCHC RBC AUTO-ENTMCNC: 35.5 G/DL (ref 31.5–36.5)
MCHC RBC AUTO-ENTMCNC: 35.8 G/DL (ref 31.5–36.5)
MCV RBC AUTO: 90 FL (ref 78–100)
MCV RBC AUTO: 91 FL (ref 78–100)
NUM BPU REQUESTED: 2
PLATELET # BLD AUTO: 73 10E9/L (ref 150–450)
PLATELET # BLD AUTO: 86 10E9/L (ref 150–450)
RBC # BLD AUTO: 4.14 10E12/L (ref 3.8–5.2)
RBC # BLD AUTO: 4.15 10E12/L (ref 3.8–5.2)
SPECIMEN EXP DATE BLD: NORMAL
TRANSFUSION STATUS PATIENT QL: NORMAL
WBC # BLD AUTO: 13.9 10E9/L (ref 4–11)
WBC # BLD AUTO: 18.4 10E9/L (ref 4–11)

## 2018-02-16 PROCEDURE — 27210794 ZZH OR GENERAL SUPPLY STERILE: Performed by: OBSTETRICS & GYNECOLOGY

## 2018-02-16 PROCEDURE — 25000131 ZZH RX MED GY IP 250 OP 636 PS 637: Performed by: OBSTETRICS & GYNECOLOGY

## 2018-02-16 PROCEDURE — 25000125 ZZHC RX 250: Performed by: NURSE ANESTHETIST, CERTIFIED REGISTERED

## 2018-02-16 PROCEDURE — 37000009 ZZH ANESTHESIA TECHNICAL FEE, EACH ADDTL 15 MIN: Performed by: OBSTETRICS & GYNECOLOGY

## 2018-02-16 PROCEDURE — 36000058 ZZH SURGERY LEVEL 3 EA 15 ADDTL MIN: Performed by: OBSTETRICS & GYNECOLOGY

## 2018-02-16 PROCEDURE — 71000013 ZZH RECOVERY PHASE 1 LEVEL 1 EA ADDTL HR: Performed by: OBSTETRICS & GYNECOLOGY

## 2018-02-16 PROCEDURE — 84460 ALANINE AMINO (ALT) (SGPT): CPT | Performed by: OBSTETRICS & GYNECOLOGY

## 2018-02-16 PROCEDURE — 25000125 ZZHC RX 250: Performed by: OBSTETRICS & GYNECOLOGY

## 2018-02-16 PROCEDURE — 25000132 ZZH RX MED GY IP 250 OP 250 PS 637: Performed by: OBSTETRICS & GYNECOLOGY

## 2018-02-16 PROCEDURE — 85027 COMPLETE CBC AUTOMATED: CPT | Performed by: OBSTETRICS & GYNECOLOGY

## 2018-02-16 PROCEDURE — 84450 TRANSFERASE (AST) (SGOT): CPT | Performed by: OBSTETRICS & GYNECOLOGY

## 2018-02-16 PROCEDURE — 88307 TISSUE EXAM BY PATHOLOGIST: CPT | Performed by: OBSTETRICS & GYNECOLOGY

## 2018-02-16 PROCEDURE — 36415 COLL VENOUS BLD VENIPUNCTURE: CPT | Performed by: OBSTETRICS & GYNECOLOGY

## 2018-02-16 PROCEDURE — 82565 ASSAY OF CREATININE: CPT | Performed by: OBSTETRICS & GYNECOLOGY

## 2018-02-16 PROCEDURE — 25000132 ZZH RX MED GY IP 250 OP 250 PS 637

## 2018-02-16 PROCEDURE — 25000128 H RX IP 250 OP 636: Performed by: NURSE ANESTHETIST, CERTIFIED REGISTERED

## 2018-02-16 PROCEDURE — 12000037 ZZH R&B POSTPARTUM INTERMEDIATE

## 2018-02-16 PROCEDURE — 25000128 H RX IP 250 OP 636: Performed by: ANESTHESIOLOGY

## 2018-02-16 PROCEDURE — 25000566 ZZH SEVOFLURANE, EA 15 MIN: Performed by: OBSTETRICS & GYNECOLOGY

## 2018-02-16 PROCEDURE — 59510 CESAREAN DELIVERY: CPT | Performed by: OBSTETRICS & GYNECOLOGY

## 2018-02-16 PROCEDURE — P9016 RBC LEUKOCYTES REDUCED: HCPCS | Performed by: OBSTETRICS & GYNECOLOGY

## 2018-02-16 PROCEDURE — 82010 KETONE BODYS QUAN: CPT | Performed by: OBSTETRICS & GYNECOLOGY

## 2018-02-16 PROCEDURE — 00000146 ZZHCL STATISTIC GLUCOSE BY METER IP

## 2018-02-16 PROCEDURE — 25000128 H RX IP 250 OP 636: Performed by: OBSTETRICS & GYNECOLOGY

## 2018-02-16 PROCEDURE — 88307 TISSUE EXAM BY PATHOLOGIST: CPT | Mod: 26 | Performed by: OBSTETRICS & GYNECOLOGY

## 2018-02-16 PROCEDURE — 25000125 ZZHC RX 250

## 2018-02-16 PROCEDURE — 71000012 ZZH RECOVERY PHASE 1 LEVEL 1 FIRST HR: Performed by: OBSTETRICS & GYNECOLOGY

## 2018-02-16 PROCEDURE — 37000008 ZZH ANESTHESIA TECHNICAL FEE, 1ST 30 MIN: Performed by: OBSTETRICS & GYNECOLOGY

## 2018-02-16 PROCEDURE — 36000056 ZZH SURGERY LEVEL 3 1ST 30 MIN: Performed by: OBSTETRICS & GYNECOLOGY

## 2018-02-16 RX ORDER — BISACODYL 10 MG
10 SUPPOSITORY, RECTAL RECTAL DAILY PRN
Status: DISCONTINUED | OUTPATIENT
Start: 2018-02-18 | End: 2018-02-19 | Stop reason: HOSPADM

## 2018-02-16 RX ORDER — ONDANSETRON 2 MG/ML
INJECTION INTRAMUSCULAR; INTRAVENOUS
Status: COMPLETED
Start: 2018-02-16 | End: 2018-02-16

## 2018-02-16 RX ORDER — DIPHENHYDRAMINE HCL 25 MG
25 CAPSULE ORAL EVERY 6 HOURS PRN
Status: DISCONTINUED | OUTPATIENT
Start: 2018-02-16 | End: 2018-02-19 | Stop reason: HOSPADM

## 2018-02-16 RX ORDER — FENTANYL CITRATE 50 UG/ML
25-50 INJECTION, SOLUTION INTRAMUSCULAR; INTRAVENOUS EVERY 5 MIN PRN
Status: DISCONTINUED | OUTPATIENT
Start: 2018-02-16 | End: 2018-02-16 | Stop reason: HOSPADM

## 2018-02-16 RX ORDER — MISOPROSTOL 200 UG/1
800 TABLET ORAL
Status: DISCONTINUED | OUTPATIENT
Start: 2018-02-16 | End: 2018-02-19 | Stop reason: HOSPADM

## 2018-02-16 RX ORDER — AMOXICILLIN 250 MG
1 CAPSULE ORAL 2 TIMES DAILY PRN
Status: DISCONTINUED | OUTPATIENT
Start: 2018-02-16 | End: 2018-02-17

## 2018-02-16 RX ORDER — FENTANYL CITRATE 50 UG/ML
INJECTION, SOLUTION INTRAMUSCULAR; INTRAVENOUS
Status: DISCONTINUED
Start: 2018-02-16 | End: 2018-02-16

## 2018-02-16 RX ORDER — CEFAZOLIN SODIUM 1 G/3ML
INJECTION, POWDER, FOR SOLUTION INTRAMUSCULAR; INTRAVENOUS
Status: COMPLETED
Start: 2018-02-16 | End: 2018-02-16

## 2018-02-16 RX ORDER — ACETAMINOPHEN 325 MG/1
650 TABLET ORAL EVERY 4 HOURS PRN
Status: DISCONTINUED | OUTPATIENT
Start: 2018-02-19 | End: 2018-02-19 | Stop reason: HOSPADM

## 2018-02-16 RX ORDER — PROCHLORPERAZINE 25 MG
25 SUPPOSITORY, RECTAL RECTAL EVERY 12 HOURS PRN
Status: DISCONTINUED | OUTPATIENT
Start: 2018-02-16 | End: 2018-02-19 | Stop reason: HOSPADM

## 2018-02-16 RX ORDER — AMOXICILLIN 250 MG
1 CAPSULE ORAL 2 TIMES DAILY PRN
Status: DISCONTINUED | OUTPATIENT
Start: 2018-02-16 | End: 2018-02-19 | Stop reason: HOSPADM

## 2018-02-16 RX ORDER — OXYTOCIN/0.9 % SODIUM CHLORIDE 30/500 ML
340 PLASTIC BAG, INJECTION (ML) INTRAVENOUS CONTINUOUS PRN
Status: DISCONTINUED | OUTPATIENT
Start: 2018-02-16 | End: 2018-02-19 | Stop reason: HOSPADM

## 2018-02-16 RX ORDER — ONDANSETRON 4 MG/1
4 TABLET, ORALLY DISINTEGRATING ORAL EVERY 30 MIN PRN
Status: DISCONTINUED | OUTPATIENT
Start: 2018-02-16 | End: 2018-02-16 | Stop reason: HOSPADM

## 2018-02-16 RX ORDER — SODIUM CHLORIDE, SODIUM LACTATE, POTASSIUM CHLORIDE, CALCIUM CHLORIDE 600; 310; 30; 20 MG/100ML; MG/100ML; MG/100ML; MG/100ML
INJECTION, SOLUTION INTRAVENOUS CONTINUOUS
Status: DISCONTINUED | OUTPATIENT
Start: 2018-02-16 | End: 2018-02-16 | Stop reason: HOSPADM

## 2018-02-16 RX ORDER — OXYTOCIN 10 [USP'U]/ML
10 INJECTION, SOLUTION INTRAMUSCULAR; INTRAVENOUS
Status: DISCONTINUED | OUTPATIENT
Start: 2018-02-16 | End: 2018-02-19 | Stop reason: HOSPADM

## 2018-02-16 RX ORDER — OXYTOCIN/0.9 % SODIUM CHLORIDE 30/500 ML
50 PLASTIC BAG, INJECTION (ML) INTRAVENOUS CONTINUOUS
Status: DISCONTINUED | OUTPATIENT
Start: 2018-02-16 | End: 2018-02-19 | Stop reason: HOSPADM

## 2018-02-16 RX ORDER — LANOLIN 100 %
OINTMENT (GRAM) TOPICAL
Status: DISCONTINUED | OUTPATIENT
Start: 2018-02-16 | End: 2018-02-19 | Stop reason: HOSPADM

## 2018-02-16 RX ORDER — LIDOCAINE HYDROCHLORIDE 20 MG/ML
INJECTION, SOLUTION EPIDURAL; INFILTRATION; INTRACAUDAL; PERINEURAL
Status: DISCONTINUED
Start: 2018-02-16 | End: 2018-02-16 | Stop reason: HOSPADM

## 2018-02-16 RX ORDER — DEXAMETHASONE SODIUM PHOSPHATE 4 MG/ML
INJECTION, SOLUTION INTRA-ARTICULAR; INTRALESIONAL; INTRAMUSCULAR; INTRAVENOUS; SOFT TISSUE
Status: COMPLETED
Start: 2018-02-16 | End: 2018-02-16

## 2018-02-16 RX ORDER — NICOTINE POLACRILEX 4 MG
15-30 LOZENGE BUCCAL
Status: DISCONTINUED | OUTPATIENT
Start: 2018-02-16 | End: 2018-02-16

## 2018-02-16 RX ORDER — METOCLOPRAMIDE 10 MG/1
10 TABLET ORAL EVERY 6 HOURS PRN
Status: DISCONTINUED | OUTPATIENT
Start: 2018-02-16 | End: 2018-02-17

## 2018-02-16 RX ORDER — ACETAMINOPHEN 325 MG/1
975 TABLET ORAL EVERY 8 HOURS
Status: COMPLETED | OUTPATIENT
Start: 2018-02-16 | End: 2018-02-19

## 2018-02-16 RX ORDER — SODIUM CHLORIDE 9 MG/ML
INJECTION, SOLUTION INTRAVENOUS CONTINUOUS PRN
Status: DISCONTINUED | OUTPATIENT
Start: 2018-02-16 | End: 2018-02-16

## 2018-02-16 RX ORDER — LABETALOL HYDROCHLORIDE 5 MG/ML
10 INJECTION, SOLUTION INTRAVENOUS
Status: DISCONTINUED | OUTPATIENT
Start: 2018-02-16 | End: 2018-02-16 | Stop reason: HOSPADM

## 2018-02-16 RX ORDER — PROCHLORPERAZINE MALEATE 10 MG
10 TABLET ORAL EVERY 6 HOURS PRN
Status: DISCONTINUED | OUTPATIENT
Start: 2018-02-16 | End: 2018-02-17

## 2018-02-16 RX ORDER — OXYTOCIN/0.9 % SODIUM CHLORIDE 30/500 ML
PLASTIC BAG, INJECTION (ML) INTRAVENOUS
Status: DISCONTINUED
Start: 2018-02-16 | End: 2018-02-16 | Stop reason: HOSPADM

## 2018-02-16 RX ORDER — DEXTROSE, SODIUM CHLORIDE, SODIUM LACTATE, POTASSIUM CHLORIDE, AND CALCIUM CHLORIDE 5; .6; .31; .03; .02 G/100ML; G/100ML; G/100ML; G/100ML; G/100ML
INJECTION, SOLUTION INTRAVENOUS CONTINUOUS
Status: DISCONTINUED | OUTPATIENT
Start: 2018-02-16 | End: 2018-02-16

## 2018-02-16 RX ORDER — SODIUM CHLORIDE 9 MG/ML
INJECTION, SOLUTION INTRAVENOUS CONTINUOUS
Status: DISCONTINUED | OUTPATIENT
Start: 2018-02-16 | End: 2018-02-16

## 2018-02-16 RX ORDER — CITRIC ACID/SODIUM CITRATE 334-500MG
SOLUTION, ORAL ORAL
Status: DISCONTINUED
Start: 2018-02-16 | End: 2018-02-16 | Stop reason: HOSPADM

## 2018-02-16 RX ORDER — PROCHLORPERAZINE 25 MG
25 SUPPOSITORY, RECTAL RECTAL EVERY 12 HOURS PRN
Status: DISCONTINUED | OUTPATIENT
Start: 2018-02-16 | End: 2018-02-17

## 2018-02-16 RX ORDER — NALOXONE HYDROCHLORIDE 0.4 MG/ML
.1-.4 INJECTION, SOLUTION INTRAMUSCULAR; INTRAVENOUS; SUBCUTANEOUS
Status: DISCONTINUED | OUTPATIENT
Start: 2018-02-16 | End: 2018-02-19 | Stop reason: HOSPADM

## 2018-02-16 RX ORDER — CARBOPROST TROMETHAMINE 250 UG/ML
250 INJECTION, SOLUTION INTRAMUSCULAR ONCE
Status: COMPLETED | OUTPATIENT
Start: 2018-02-16 | End: 2018-02-16

## 2018-02-16 RX ORDER — HYDROXYZINE HYDROCHLORIDE 25 MG/1
25 TABLET, FILM COATED ORAL EVERY 6 HOURS PRN
Status: DISCONTINUED | OUTPATIENT
Start: 2018-02-16 | End: 2018-02-17

## 2018-02-16 RX ORDER — DIPHENHYDRAMINE HYDROCHLORIDE 50 MG/ML
25 INJECTION INTRAMUSCULAR; INTRAVENOUS EVERY 6 HOURS PRN
Status: DISCONTINUED | OUTPATIENT
Start: 2018-02-16 | End: 2018-02-19 | Stop reason: HOSPADM

## 2018-02-16 RX ORDER — ONDANSETRON 4 MG/1
4 TABLET, ORALLY DISINTEGRATING ORAL EVERY 6 HOURS PRN
Status: DISCONTINUED | OUTPATIENT
Start: 2018-02-16 | End: 2018-02-17

## 2018-02-16 RX ORDER — HYDROCORTISONE 2.5 %
CREAM (GRAM) TOPICAL 3 TIMES DAILY PRN
Status: DISCONTINUED | OUTPATIENT
Start: 2018-02-16 | End: 2018-02-19 | Stop reason: HOSPADM

## 2018-02-16 RX ORDER — HYDROMORPHONE HYDROCHLORIDE 1 MG/ML
.3-.5 INJECTION, SOLUTION INTRAMUSCULAR; INTRAVENOUS; SUBCUTANEOUS EVERY 5 MIN PRN
Status: DISCONTINUED | OUTPATIENT
Start: 2018-02-16 | End: 2018-02-16 | Stop reason: HOSPADM

## 2018-02-16 RX ORDER — NALOXONE HYDROCHLORIDE 0.4 MG/ML
.1-.4 INJECTION, SOLUTION INTRAMUSCULAR; INTRAVENOUS; SUBCUTANEOUS
Status: DISCONTINUED | OUTPATIENT
Start: 2018-02-16 | End: 2018-02-17

## 2018-02-16 RX ORDER — METOCLOPRAMIDE HYDROCHLORIDE 5 MG/ML
10 INJECTION INTRAMUSCULAR; INTRAVENOUS EVERY 6 HOURS PRN
Status: DISCONTINUED | OUTPATIENT
Start: 2018-02-16 | End: 2018-02-17

## 2018-02-16 RX ORDER — DEXTROSE MONOHYDRATE 25 G/50ML
25-50 INJECTION, SOLUTION INTRAVENOUS
Status: DISCONTINUED | OUTPATIENT
Start: 2018-02-16 | End: 2018-02-16

## 2018-02-16 RX ORDER — CARBOPROST TROMETHAMINE 250 UG/ML
INJECTION, SOLUTION INTRAMUSCULAR
Status: COMPLETED
Start: 2018-02-16 | End: 2018-02-16

## 2018-02-16 RX ORDER — ONDANSETRON 2 MG/ML
4 INJECTION INTRAMUSCULAR; INTRAVENOUS EVERY 30 MIN PRN
Status: DISCONTINUED | OUTPATIENT
Start: 2018-02-16 | End: 2018-02-16 | Stop reason: HOSPADM

## 2018-02-16 RX ORDER — NALOXONE HYDROCHLORIDE 0.4 MG/ML
.1-.4 INJECTION, SOLUTION INTRAMUSCULAR; INTRAVENOUS; SUBCUTANEOUS
Status: ACTIVE | OUTPATIENT
Start: 2018-02-16 | End: 2018-02-17

## 2018-02-16 RX ORDER — ONDANSETRON 2 MG/ML
4 INJECTION INTRAMUSCULAR; INTRAVENOUS EVERY 6 HOURS PRN
Status: DISCONTINUED | OUTPATIENT
Start: 2018-02-16 | End: 2018-02-19 | Stop reason: HOSPADM

## 2018-02-16 RX ORDER — AMOXICILLIN 250 MG
2 CAPSULE ORAL 2 TIMES DAILY PRN
Status: DISCONTINUED | OUTPATIENT
Start: 2018-02-16 | End: 2018-02-19 | Stop reason: HOSPADM

## 2018-02-16 RX ORDER — AMOXICILLIN 250 MG
2 CAPSULE ORAL 2 TIMES DAILY PRN
Status: DISCONTINUED | OUTPATIENT
Start: 2018-02-16 | End: 2018-02-17

## 2018-02-16 RX ORDER — PROPOFOL 10 MG/ML
INJECTION, EMULSION INTRAVENOUS
Status: DISCONTINUED
Start: 2018-02-16 | End: 2018-02-16 | Stop reason: HOSPADM

## 2018-02-16 RX ORDER — CARBOPROST TROMETHAMINE 250 UG/ML
250 INJECTION, SOLUTION INTRAMUSCULAR
Status: DISCONTINUED | OUTPATIENT
Start: 2018-02-16 | End: 2018-02-19 | Stop reason: HOSPADM

## 2018-02-16 RX ORDER — MAGNESIUM SULFATE IN WATER 40 MG/ML
2 INJECTION, SOLUTION INTRAVENOUS CONTINUOUS
Status: DISCONTINUED | OUTPATIENT
Start: 2018-02-16 | End: 2018-02-17

## 2018-02-16 RX ORDER — OXYCODONE HYDROCHLORIDE 5 MG/1
5-10 TABLET ORAL
Status: DISCONTINUED | OUTPATIENT
Start: 2018-02-16 | End: 2018-02-19 | Stop reason: HOSPADM

## 2018-02-16 RX ORDER — FENTANYL CITRATE 50 UG/ML
100 INJECTION, SOLUTION INTRAMUSCULAR; INTRAVENOUS
Status: DISCONTINUED | OUTPATIENT
Start: 2018-02-16 | End: 2018-02-17

## 2018-02-16 RX ORDER — DEXAMETHASONE SODIUM PHOSPHATE 4 MG/ML
INJECTION, SOLUTION INTRA-ARTICULAR; INTRALESIONAL; INTRAMUSCULAR; INTRAVENOUS; SOFT TISSUE PRN
Status: DISCONTINUED | OUTPATIENT
Start: 2018-02-16 | End: 2018-02-16

## 2018-02-16 RX ORDER — ONDANSETRON 2 MG/ML
INJECTION INTRAMUSCULAR; INTRAVENOUS PRN
Status: DISCONTINUED | OUTPATIENT
Start: 2018-02-16 | End: 2018-02-16

## 2018-02-16 RX ORDER — PROPOFOL 10 MG/ML
INJECTION, EMULSION INTRAVENOUS PRN
Status: DISCONTINUED | OUTPATIENT
Start: 2018-02-16 | End: 2018-02-16

## 2018-02-16 RX ORDER — LIDOCAINE HYDROCHLORIDE 20 MG/ML
INJECTION, SOLUTION INFILTRATION; PERINEURAL PRN
Status: DISCONTINUED | OUTPATIENT
Start: 2018-02-16 | End: 2018-02-16

## 2018-02-16 RX ORDER — ONDANSETRON 2 MG/ML
4 INJECTION INTRAMUSCULAR; INTRAVENOUS EVERY 6 HOURS PRN
Status: DISCONTINUED | OUTPATIENT
Start: 2018-02-16 | End: 2018-02-17

## 2018-02-16 RX ORDER — FENTANYL CITRATE 50 UG/ML
INJECTION, SOLUTION INTRAMUSCULAR; INTRAVENOUS PRN
Status: DISCONTINUED | OUTPATIENT
Start: 2018-02-16 | End: 2018-02-16

## 2018-02-16 RX ORDER — LIDOCAINE 40 MG/G
CREAM TOPICAL
Status: DISCONTINUED | OUTPATIENT
Start: 2018-02-16 | End: 2018-02-19 | Stop reason: HOSPADM

## 2018-02-16 RX ORDER — SIMETHICONE 80 MG
80 TABLET,CHEWABLE ORAL 4 TIMES DAILY PRN
Status: DISCONTINUED | OUTPATIENT
Start: 2018-02-16 | End: 2018-02-19 | Stop reason: HOSPADM

## 2018-02-16 RX ORDER — CEFAZOLIN SODIUM 1 G/3ML
INJECTION, POWDER, FOR SOLUTION INTRAMUSCULAR; INTRAVENOUS PRN
Status: DISCONTINUED | OUTPATIENT
Start: 2018-02-16 | End: 2018-02-16

## 2018-02-16 RX ADMIN — FENTANYL CITRATE 100 MCG: 50 INJECTION INTRAMUSCULAR; INTRAVENOUS at 02:31

## 2018-02-16 RX ADMIN — SODIUM CHLORIDE 2.5 MILLION UNITS: 9 INJECTION, SOLUTION INTRAVENOUS at 00:33

## 2018-02-16 RX ADMIN — PHENYLEPHRINE HYDROCHLORIDE 100 MCG: 10 INJECTION INTRAVENOUS at 12:01

## 2018-02-16 RX ADMIN — SODIUM CHLORIDE 1 UNITS/HR: 9 INJECTION, SOLUTION INTRAVENOUS at 05:51

## 2018-02-16 RX ADMIN — DEXAMETHASONE SODIUM PHOSPHATE 4 MG: 4 INJECTION, SOLUTION INTRA-ARTICULAR; INTRALESIONAL; INTRAMUSCULAR; INTRAVENOUS; SOFT TISSUE at 12:01

## 2018-02-16 RX ADMIN — CARBOPROST TROMETHAMINE 250 MCG: 250 INJECTION, SOLUTION INTRAMUSCULAR at 12:01

## 2018-02-16 RX ADMIN — SODIUM CHLORIDE, SODIUM LACTATE, POTASSIUM CHLORIDE, CALCIUM CHLORIDE, AND DEXTROSE MONOHYDRATE: 600; 310; 30; 20; 5 INJECTION, SOLUTION INTRAVENOUS at 07:43

## 2018-02-16 RX ADMIN — PROPOFOL 150 MG: 10 INJECTION, EMULSION INTRAVENOUS at 11:46

## 2018-02-16 RX ADMIN — FENTANYL CITRATE 100 MCG: 50 INJECTION, SOLUTION INTRAMUSCULAR; INTRAVENOUS at 11:46

## 2018-02-16 RX ADMIN — SUCCINYLCHOLINE CHLORIDE 80 MG: 20 INJECTION, SOLUTION INTRAMUSCULAR; INTRAVENOUS; PARENTERAL at 11:46

## 2018-02-16 RX ADMIN — FENTANYL CITRATE 100 MCG: 50 INJECTION INTRAMUSCULAR; INTRAVENOUS at 01:30

## 2018-02-16 RX ADMIN — HYDROMORPHONE HYDROCHLORIDE: 10 INJECTION, SOLUTION INTRAMUSCULAR; INTRAVENOUS; SUBCUTANEOUS at 13:24

## 2018-02-16 RX ADMIN — ONDANSETRON 4 MG: 2 INJECTION INTRAMUSCULAR; INTRAVENOUS at 12:01

## 2018-02-16 RX ADMIN — CEFAZOLIN 2 G: 1 INJECTION, POWDER, FOR SOLUTION INTRAMUSCULAR; INTRAVENOUS at 11:45

## 2018-02-16 RX ADMIN — FENTANYL CITRATE 100 MCG: 50 INJECTION INTRAMUSCULAR; INTRAVENOUS at 06:45

## 2018-02-16 RX ADMIN — MAGNESIUM SULFATE IN WATER 2 G/HR: 40 INJECTION, SOLUTION INTRAVENOUS at 13:44

## 2018-02-16 RX ADMIN — SODIUM CHLORIDE: 9 INJECTION, SOLUTION INTRAVENOUS at 11:30

## 2018-02-16 RX ADMIN — MAGNESIUM SULFATE IN WATER 2 G/HR: 40 INJECTION, SOLUTION INTRAVENOUS at 23:55

## 2018-02-16 RX ADMIN — FENTANYL CITRATE 50 MCG: 50 INJECTION, SOLUTION INTRAMUSCULAR; INTRAVENOUS at 12:08

## 2018-02-16 RX ADMIN — SODIUM CHLORIDE 2.5 MILLION UNITS: 9 INJECTION, SOLUTION INTRAVENOUS at 08:25

## 2018-02-16 RX ADMIN — FENTANYL CITRATE 100 MCG: 50 INJECTION INTRAMUSCULAR; INTRAVENOUS at 03:37

## 2018-02-16 RX ADMIN — FENTANYL CITRATE 100 MCG: 50 INJECTION INTRAMUSCULAR; INTRAVENOUS at 04:38

## 2018-02-16 RX ADMIN — SODIUM CITRATE AND CITRIC ACID MONOHYDRATE: 500; 334 SOLUTION ORAL at 11:45

## 2018-02-16 RX ADMIN — HYDROMORPHONE HYDROCHLORIDE 0.5 MG: 1 INJECTION, SOLUTION INTRAMUSCULAR; INTRAVENOUS; SUBCUTANEOUS at 13:42

## 2018-02-16 RX ADMIN — SODIUM CHLORIDE: 9 INJECTION, SOLUTION INTRAVENOUS at 05:49

## 2018-02-16 RX ADMIN — LIDOCAINE HYDROCHLORIDE 40 MG: 20 INJECTION, SOLUTION INFILTRATION; PERINEURAL at 11:46

## 2018-02-16 RX ADMIN — FENTANYL CITRATE 50 MCG: 50 INJECTION, SOLUTION INTRAMUSCULAR; INTRAVENOUS at 12:45

## 2018-02-16 RX ADMIN — SODIUM CHLORIDE 2.5 MILLION UNITS: 9 INJECTION, SOLUTION INTRAVENOUS at 04:38

## 2018-02-16 RX ADMIN — OXYTOCIN-SODIUM CHLORIDE 0.9% IV SOLN 30 UNIT/500ML 340 ML/HR: 30-0.9/5 SOLUTION at 11:54

## 2018-02-16 RX ADMIN — ACETAMINOPHEN 975 MG: 325 TABLET, FILM COATED ORAL at 20:01

## 2018-02-16 RX ADMIN — FENTANYL CITRATE 100 MCG: 50 INJECTION INTRAMUSCULAR; INTRAVENOUS at 05:47

## 2018-02-16 RX ADMIN — OXYTOCIN-SODIUM CHLORIDE 0.9% IV SOLN 30 UNIT/500ML 50 ML/HR: 30-0.9/5 SOLUTION at 17:46

## 2018-02-16 NOTE — PROGRESS NOTES
[facility] Labor and Delivery Progress Note    Rosmery Garcia MRN# [id number]   Age: 37 year old YOB: 1980           Subjective:   Tolerating the painful contractions.           Objective:   Patient Vitals for the past 8 hrs:   BP Temp Temp src Resp   18 0645 154/82 99.8  F (37.7  C) Temporal -   18 0545 159/77 - - -   18 0505 141/82 - - -   18 0245 156/71 - - 22   18 0140 145/74 - - -   18 0130 - 98.9  F (37.2  C) Temporal -   18 0040 142/89 - - 18        Cervical Exam: 8.5/ 100 / +1     Cervix thick on maternal right. IUPC placed    Membranes: Ruptured      Fetal Heart Rate:    Monitor: External US    Variability: Moderate    Baseline Rate: 145 bpm    Fetal Heart Rate Tracing: reactive          Assessment:   Rosmery Garcia is a 37 year old  who is 39w1d with history of prior  section, GDM on insulin and who presented for her scheduled IOL for GDM with elevated blood pressures and preeclampsia.           Plan:   IUPC placed due to lack of cervical change. Will encourage pitocin to be increased until she is adequately dima  If dima adequately and no cervical change over one hour will proceed with  section for arrest of dilation.  Continue insulin drip  Next preeclamptic labs due at 10 am. BP in normal to mildly elevated range. Continue to defer magnesium at this time.     Maria Ines Delong MD

## 2018-02-16 NOTE — PROGRESS NOTES
"Decision to Proceed with  section    Subjective: painful contractions.  at bedside    Objective  Vital signs:  Temp: 99.8  F (37.7  C) Temp src: Temporal BP: 155/81     Resp: 18       Height: 149.9 cm (4' 11\") Weight: 68 kg (150 lb)  Estimated body mass index is 30.3 kg/(m^2) as calculated from the following:    Height as of this encounter: 1.499 m (4' 11\").    Weight as of this encounter: 68 kg (150 lb).    GEN: NAD  SVE: 8.5/100/-2.   [place]: q5 mins. 230 average MVU  FHT: 150 bpm/ mod jose/+ accels/ + intermittent jose decels    Assessment: 38 yo  at 39+1 wga with preeclampsia with severe features of thrombocytopenia, GDM on insulin and history of prior  section    Plan: arrest of labor with adequate uterine contractions. Rosmery was asked if she needed a  for the consenting and she declined. She was consented for a  section for arrest of labor. The risks of the procedure were discussed. She accepts blood if needed.    Maria Ines Delong MD    "

## 2018-02-16 NOTE — PROGRESS NOTES
Labor Progress Note    Doing well. Tolerating mild abdominal cramps.    Vital  Signs reviewed. Non-sustained elevated blood pressures and some blood pressures in the normal range.    Cook balloon removed (7.5 hrs in place)  SVE:2/50/-3, non-ballotable  AROM with blood tinged otherwise clear fluid  SVE after AROM: 2.5/50/-3    Labs from 1600 reviewed and essentially stable  Will continue to defer Magnesium until BP gets into severe range and remains sustained requiring IV antihypertensive  Oxytocin will be added on for continued augmentation. PCN to start now.  Continue to repeat labs every 6 hours  In person  present at bedside and we reviewed her diagnosis as well as her platelet count and the significance of it.   She was once again counseled that we will continue the induction of labor for a vaginal delivery but move towards a  section if her condition deteriorates or for fetal indication.  Will continue NPO status and continue D5 IV.    Maria Ines Delong MD

## 2018-02-16 NOTE — ANESTHESIA POSTPROCEDURE EVALUATION
Patient: Rosmery Garcia    Procedure(s):   - Wound Class: II-Clean Contaminated    Diagnosis:FTP  Diagnosis Additional Information: No value filed.    Anesthesia Type:  General, RSI, ETT    Note:  Anesthesia Post Evaluation    Patient location during evaluation: PACU  Patient participation: Able to fully participate in evaluation  Level of consciousness: awake  Pain management: adequate  Airway patency: patent  Cardiovascular status: acceptable  Respiratory status: acceptable  Hydration status: acceptable  PONV: none     Anesthetic complications: None          Last vitals:  Vitals:    02/16/18 1340 02/16/18 1350 02/16/18 1400   BP: 142/86 (!) 151/93 (!) 162/95   Resp: 21 17 17   Temp: 36.7  C (98.1  F) 36.7  C (98.1  F) 36.6  C (97.9  F)   SpO2: 94% 94% 95%         Electronically Signed By: YVES ROSE MD  February 16, 2018  2:31 PM

## 2018-02-16 NOTE — PROGRESS NOTES
1920 Assumed care of patient, Dr Delong at bedside.  1927 AROM, clear.  1930 Dr Delong discussing plan of care with patient with  present and interpreting.  0130 Dr Delong on unit and updated on patient status, SVE, BP's, contractions and fetal tracing and that patient requesting fentanyl.  Order for fentanyl obtained.  0307 Dr Delong informed of blood sugar 111, order to hold insulin at this time.  0407 Dr Delong updated on patient blood sugar and SVE. 0551  Insulin started 1 unit per protocol.   0610 Dr Delong updated on SVE 7/95/0, order to increase pitocin to 6 mu and recheck cervix in 1 hour.  0710 Dr Delong updated on SVE 9/100/+1. 0810 Dr Delong at bedside SVE 9/100/+1.  IUPC placed.  1020 Dr Delong at bedside and updated on SVE 9/100/+1, C/S discussed.  1045 patient prepped for C/S. 1120  Report given to Ivone DONOVAN RN.

## 2018-02-16 NOTE — OP NOTE
Procedure Date: 2018      PREOPERATIVE DIAGNOSES:   1.  Term Intrauterine pregnancy at 39 + 1 weeks gestational age.   2.  Preeclampsia with severe features of thrombocytopenia.   3.  Prior  section, failed trial of labor due to failure to progress at 8 cm.   4.  Gestational diabetes mellitus on insulin.   5.  Advanced maternal age.      POSTOPERATIVE DIAGNOSES:     1.  Term intrauterine pregnancy at 39 + 1 weeks gestational age.   2.  Preeclampsia with severe features of thrombocytopenia.   3.  Prior  section, failed trial of labor due to failure to progress at 8 cm.   4.  Gestational diabetes mellitus on insulin.   5.  Advanced maternal age.    6.  Occiput posterior fetal presentation.      PROCEDURE:   Repeat low transverse  section.      SURGEON:  Dr. Maria Ines Delong MD.       ASSISTANT:  None.      ANESTHESIA:  Received general endotracheal anesthesia.      ESTIMATED BLOOD LOSS:  1000 mL.      MEDICATIONS:  Received 250 mcg of Hemabate intramuscularly.      TOTAL URINE OUTPUT:  450 mL.      DRAINS:  Ward catheter.      SPECIMENS:  Placenta to pathology.      IMPLANTS:  None.      INTRAOPERATIVE FINDINGS:  Female infant in occiput posterior presentation.  Apgars 8 and 9, weight 7 pounds 4 ounces.  Dense intra-abdominal adhesions between the peritoneum and uterine serosa and between the bladder and the fascia.  Bilateral ovaries palpated and noted to be normal.  Bilateral fallopian tubes visualized and noted to be of normal anatomy.      COMPLICATIONS:  None.      CONDITION:  Stable to recovery room.      INDICATIONS:  Rosmery Garcia is a 37-year-old  2, now para 2-0-0-2, who had presented to Labor and Delivery for scheduled induction of labor at 39 + 0 weeks gestational age for gestational diabetes on insulin.  She had a history of prior  section as well as a history of prior gestational diabetes as well as preeclampsia with thrombocytopenia.  Therefore,  she was placed on aspirin throughout the duration of her pregnancy.      Upon presentation to Labor and Delivery, she was noted to have elevated blood pressures to the 140s/100s.  At this point, the patient and her partner were counseled about the pros and cons of proceeding with an induction of labor versus a  section without induction of labor.  The couple decided on induction of labor.  Therefore, she was induced initially with a cervical ripening balloon and then with amniotomy with augmentation with oxytocin.  She progressed to 8 cm dilation and was noted to be unchanged after an hour and a half.  Therefore intrauterine pressure catheter was placed with oxytocin increased to ensure at least 200 [place] units.  After another 2 hours with no change in dilation and with some swelling of the cervix she was consented for a repeat low transverse  section for failure to progress and failed TOLAC.        PROCEDURE IN DETAIL:  The patient was taken to the Operating Room, IV fluids running.  Although she has been receiving penicillin for GBS prophylaxis she received additional Ancef for infection prophylaxis.  Due to her preeclampsia, her platelets had nadired at 67, but were currently at 86 at the time of her  section.  Because of platelets of less than 100, it was felt to be unsafe to administer spinal or epidural anesthesia.  Therefore, the  section was performed under general anesthesia.  So the patient was placed in dorsal supine position on the operating room.  Fetal heart tones were auscultated and noted to be normal.  She was then prepped and draped in the usual sterile fashion and anesthesia was induced and a Pfannenstiel skin incision was made in the lower abdomen, carried down to the level of the fascia.  Upon incising the fascia in the midline, were noted to be in the intraperitoneal cavity just above the bladder.  The fascial incision was then carefully extended laterally  and the peritoneum was entered sharply above the initial level of entry to the peritoneum to ensure no trauma to the bladder.  The Ward bulb was identified and no cystotomy was noted.  Therefore, after entry into the peritoneal cavity there was some dense adhesions noted between the superior aspect of the uterine serosa and the peritoneum as well as between the fascia and the bladder.  A bladder flap was then created with the Gabriel O retractor introduced.  A bladder blade was then used to retract the bladder from the lower uterine segment.  A low transverse incision was then made.  Clear amniotic fluid was noted.  The infant was noted to be in occiput posterior presentation.  Upon delivery, there was an immediate cry and vigorous activity.  Therefore, delayed cord clamping was performed after 45 seconds.  The infant was handed off to the waiting nurse practitioner.  The placenta was delivered via external massage after collecting the umbilical cord gases and a cord segment.  There was initial uterine atony noted.  Therefore, the patient was given a dose of Hemabate IM.  The uterus was unable to be exteriorized due to the dense adhesions.  Therefore, the uterine cavity was cleaned of all clots and debris and the hysterotomy was repaired in a single layer using 0 Vicryl suture in a running locked fashion.  After the initial repair, bilateral ovaries were palpated and noted to be of normal anatomy.  Bilateral fallopian tubes were also noted to be normal.  The hysterotomy was examined.  When no bleeding was noted, an additional second layer was not performed.  The Gabriel O retractor was then removed.  Then, the underlying rectus and fascia were examined.  When no bleeding was noted the fascia was then reapproximated using 0 Vicryl suture in a running fashion.  The subcutaneous tissue was irrigated with normal saline and the skin was reapproximated using 3-0 Monocryl on a Dilip needle.  At the end of the procedure, all  instruments were noted to be correct x 3.  The patient was taken to the recovery room, normotensive and in stable condition.         CECILIA MORE MD             D: 2018   T: 2018   MT: PATRICIA      Name:     EDGAR YO   MRN:      [id number]        Account:        [id number]   :      1980           Procedure Date: 2018      Document: [id number]

## 2018-02-16 NOTE — ANESTHESIA CARE TRANSFER NOTE
Patient: Rosmery Garcia    Procedure(s):   - Wound Class: II-Clean Contaminated    Diagnosis: FTP  Diagnosis Additional Information: No value filed.    Anesthesia Type:   General, RSI, ETT     Note:  Airway :Face Mask  Patient transferred to:PACU  Comments: Pt awake and able to verbalize needs. ALL monitors on and audible. Vital signs stable. Spontaneous respiration without difficulty. o2 sats 99% on 10Lo2 per face mask. Pt denies pain. Report given to PACU RN.Handoff Report: Identifed the Patient, Identified the Reponsible Provider, Reviewed the pertinent medical history, Discussed the surgical course, Reviewed Intra-OP anesthesia mangement and issues during anesthesia, Set expectations for post-procedure period and Allowed opportunity for questions and acknowledgement of understanding      Vitals: (Last set prior to Anesthesia Care Transfer)    CRNA VITALS  2/16/2018 1210 - 2/16/2018 1257      2/16/2018             Resp Rate (set): 10                Electronically Signed By: LORELEI Bryant CRNA  February 16, 2018  12:57 PM

## 2018-02-16 NOTE — ANESTHESIA PREPROCEDURE EVALUATION
Anesthesia Evaluation     .             ROS/MED HX    ENT/Pulmonary:      (-) sleep apnea   Neurologic:       Cardiovascular:     (+) hypertension----. : . . . :. .       METS/Exercise Tolerance:     Hematologic:     (+) Other Hematologic Disorder-thrombocytopenia;      Musculoskeletal:         GI/Hepatic:        (-) GERD   Renal/Genitourinary:         Endo:         Psychiatric:         Infectious Disease:         Malignancy:         Other:                     Physical Exam  Normal systems: cardiovascular, pulmonary and dental    Airway   Mallampati: II  TM distance: >3 FB  Neck ROM: full    Dental     Cardiovascular       Pulmonary             (+) pre-eclampsia               Anesthesia Plan      History & Physical Review  History and physical reviewed and following examination; no interval change.    ASA Status:  2 .    NPO Status:  > 8 hours    Plan for General, RSI and ETT with Intravenous induction. Maintenance will be Balanced.    PONV prophylaxis:  Ondansetron (or other 5HT-3)       Postoperative Care  Postoperative pain management:  IV analgesics.      Consents  Anesthetic plan, risks, benefits and alternatives discussed with:  Patient..                          .

## 2018-02-16 NOTE — BRIEF OP NOTE
[facility]  Gynecology Brief Operative Note    Pre-operative diagnosis: Preeclampsia with severe features. Thrombocytopenia. Prior  Section. Failed Trial of Labor due to failure to progress. Gestational Diabetes Mellitus on insulin   Post-operative diagnosis Same as preoperative diagnosis including Occiput Posterior fetal presentation   Procedure: Procedure(s):   SECTION   Surgeon: Maria Ines Delong MD   Assistants(s): none   Anesthesia: General    Estimated blood loss: 1000ml            Total urine output: 450 ml   Drains: Ward catheter   Specimens: Placenta to Pathology     Implants: None   Findings: Female infant in occiput posterior presentation. Apgars of 8,9. Weight 7 lbs 4 ounces. Dense intraabdominal adhesions between the peritoneum and uterine serosa and between the bladder and the fascia. Bilateral ovaries palpated to be normal.   Complications: None   Condition: Stable  Transferred to post-anesthesia recovery   Comments: See dictated operative report for full details  Dictation Reference Number: [id number]

## 2018-02-17 LAB
ERYTHROCYTE [DISTWIDTH] IN BLOOD BY AUTOMATED COUNT: 15.2 % (ref 10–15)
GLUCOSE BLDC GLUCOMTR-MCNC: 135 MG/DL (ref 70–99)
HCT VFR BLD AUTO: 28.1 % (ref 35–47)
HGB BLD-MCNC: 9.9 G/DL (ref 11.7–15.7)
MCH RBC QN AUTO: 32.2 PG (ref 26.5–33)
MCHC RBC AUTO-ENTMCNC: 35.2 G/DL (ref 31.5–36.5)
MCV RBC AUTO: 92 FL (ref 78–100)
PLATELET # BLD AUTO: 94 10E9/L (ref 150–450)
RBC # BLD AUTO: 3.07 10E12/L (ref 3.8–5.2)
WBC # BLD AUTO: 13.2 10E9/L (ref 4–11)

## 2018-02-17 PROCEDURE — 36415 COLL VENOUS BLD VENIPUNCTURE: CPT | Performed by: OBSTETRICS & GYNECOLOGY

## 2018-02-17 PROCEDURE — 12000037 ZZH R&B POSTPARTUM INTERMEDIATE

## 2018-02-17 PROCEDURE — 00000146 ZZHCL STATISTIC GLUCOSE BY METER IP

## 2018-02-17 PROCEDURE — 25000132 ZZH RX MED GY IP 250 OP 250 PS 637: Performed by: OBSTETRICS & GYNECOLOGY

## 2018-02-17 PROCEDURE — 85027 COMPLETE CBC AUTOMATED: CPT | Performed by: OBSTETRICS & GYNECOLOGY

## 2018-02-17 RX ADMIN — OXYCODONE HYDROCHLORIDE 5 MG: 5 TABLET ORAL at 19:10

## 2018-02-17 RX ADMIN — OXYCODONE HYDROCHLORIDE 5 MG: 5 TABLET ORAL at 14:43

## 2018-02-17 RX ADMIN — SENNOSIDES AND DOCUSATE SODIUM 1 TABLET: 8.6; 5 TABLET ORAL at 22:15

## 2018-02-17 RX ADMIN — ACETAMINOPHEN 975 MG: 325 TABLET, FILM COATED ORAL at 04:39

## 2018-02-17 RX ADMIN — OXYCODONE HYDROCHLORIDE 10 MG: 5 TABLET ORAL at 22:14

## 2018-02-17 RX ADMIN — SENNOSIDES AND DOCUSATE SODIUM 2 TABLET: 8.6; 5 TABLET ORAL at 07:41

## 2018-02-17 RX ADMIN — ACETAMINOPHEN 975 MG: 325 TABLET, FILM COATED ORAL at 20:03

## 2018-02-17 RX ADMIN — OXYCODONE HYDROCHLORIDE 5 MG: 5 TABLET ORAL at 11:39

## 2018-02-17 RX ADMIN — ACETAMINOPHEN 975 MG: 325 TABLET, FILM COATED ORAL at 11:39

## 2018-02-17 NOTE — PLAN OF CARE
Problem: Patient Care Overview  Goal: Plan of Care/Patient Progress Review  Outcome: No Change  Patient doing well this shift. VSS. Fundus firm, midline, and U/1. Incision is still covered with dressing, some old drainage noted at beginning of shift, no change since. Bleeding WDL. Up and ambulating x1 this shift at approx 0445. Breastfeeding every 2-3 hours and supplementing with formula due to infants BG. Pain being well controlled with PO meds and dilaudid PCA . Will continue to monitor.

## 2018-02-17 NOTE — PLAN OF CARE
Problem: Patient Care Overview  Goal: Plan of Care/Patient Progress Review  Outcome: Improving  Pt doing well. Pain controlled with meds. Up ambulating and voiding.   Pt off Magnesium at 1000.  Ward removed and voided x1.  Pumping every 3 hours. Dressing intact with dried drainage which will be removed after shower this hayden.  Tolerating a regular diet. Pt denies any PIH symptoms. Plts today were 94.  HGB 9.9.  SW consult made for insurance questions. No more BS needed to be done per Dr. Powell. Pt tolertaing oral pain meds. Will continue to monitor.

## 2018-02-17 NOTE — LACTATION NOTE
Initial visit.  Infant went to NICU for low blood sugars.  Has been latching per pt.  Supplementing with formula by bottle.  Rosmery has been pumping.  Encouraged her to continue pumping every 3 hours. Rosmery reports her other daughter was in the NICU for the same thing.  She has no questions or concerns.  Will continue to follow.  Sunshine Haas RN, IBCLC

## 2018-02-17 NOTE — PLAN OF CARE
Social work consult ordered due to lack of insurance for infant. Parents would like to know their options. Social work called and voicemail was left. Will continue to monitor.

## 2018-02-17 NOTE — PROGRESS NOTES
"S: Pt doing well. Infant is being . Pain is well controlled.    O: /78  Temp 98.4  F (36.9  C) (Oral)  Resp 16  Ht 1.499 m (4' 11\")  Wt 68 kg (150 lb)  LMP 05/18/2017 (Exact Date)  SpO2 99%  Breastfeeding? Unknown  BMI 30.3 kg/m2        ABD:  Uterine fundus is firm, non-tender and at the level of the umbilicus       INC: clean/dry/intact       Lab pending today         Hemoglobin   Date Value Ref Range Status   02/16/2018 13.4 11.7 - 15.7 g/dL Final            Lab Results   Component Value Date    RH Pos 02/15/2018       A: Post-op Day #1 s/p C/Section   With preeclampsia, on Mag sulfate      Blood pressures are good    P: Continue Post Op Cares      Stop mag this afternoon after 24 hrs    "

## 2018-02-17 NOTE — PLAN OF CARE
Problem: Postpartum ( Delivery) (Adult,Obstetrics,Pediatric)  Goal: Signs and Symptoms of Listed Potential Problems Will be Absent, Minimized or Managed (Postpartum)  Signs and symptoms of listed potential problems will be absent, minimized or managed by discharge/transition of care (reference Postpartum ( Delivery) (Adult,Obstetrics,Pediatric) CPG).  Outcome: Improving  Pt doing well.  Pt admitted to room 405 and plan of care gone over with patient and spouse.  Call light given.  Pain controlled with pain meds.  PCA eductaion gone over. Breastfeeding well at this time for age of baby.  OT to be done in am regarding GDIC.  Pt declines symptoms of PIH symptoms.  Incision C/D/I with dressing intact.  PT at 1815 up and ambulated to bathroom and tolerated it well.  [place]-care done and gown changed.  Pt at this time Has +BS and toleratring fluids and not nauseous so advanced pt to a regular diet. Will continue to monitor.

## 2018-02-18 PROCEDURE — 12000037 ZZH R&B POSTPARTUM INTERMEDIATE

## 2018-02-18 PROCEDURE — 25000132 ZZH RX MED GY IP 250 OP 250 PS 637: Performed by: OBSTETRICS & GYNECOLOGY

## 2018-02-18 RX ADMIN — SENNOSIDES AND DOCUSATE SODIUM 1 TABLET: 8.6; 5 TABLET ORAL at 20:02

## 2018-02-18 RX ADMIN — OXYCODONE HYDROCHLORIDE 5 MG: 5 TABLET ORAL at 09:13

## 2018-02-18 RX ADMIN — OXYCODONE HYDROCHLORIDE 5 MG: 5 TABLET ORAL at 12:09

## 2018-02-18 RX ADMIN — SENNOSIDES AND DOCUSATE SODIUM 1 TABLET: 8.6; 5 TABLET ORAL at 09:13

## 2018-02-18 RX ADMIN — OXYCODONE HYDROCHLORIDE 5 MG: 5 TABLET ORAL at 19:04

## 2018-02-18 RX ADMIN — ACETAMINOPHEN 975 MG: 325 TABLET, FILM COATED ORAL at 12:10

## 2018-02-18 RX ADMIN — OXYCODONE HYDROCHLORIDE 5 MG: 5 TABLET ORAL at 15:49

## 2018-02-18 RX ADMIN — ACETAMINOPHEN 975 MG: 325 TABLET, FILM COATED ORAL at 04:34

## 2018-02-18 RX ADMIN — OXYCODONE HYDROCHLORIDE 5 MG: 5 TABLET ORAL at 01:11

## 2018-02-18 RX ADMIN — OXYCODONE HYDROCHLORIDE 5 MG: 5 TABLET ORAL at 04:35

## 2018-02-18 RX ADMIN — ACETAMINOPHEN 975 MG: 325 TABLET, FILM COATED ORAL at 20:02

## 2018-02-18 NOTE — PROGRESS NOTES
SW: SIDDHARTH acknowledges consult. Left message for Financial Counselor, requesting that pay the patient a visit to discuss insurance options.    SW to follow and assist with DC plan.

## 2018-02-18 NOTE — PLAN OF CARE
Problem: Patient Care Overview  Goal: Plan of Care/Patient Progress Review  Outcome: Improving  Pt doing well.  Pain controlled with meds.  Pt pumping at times but also going to nicu to see baby and breastfeed.  Incision C/D/I with steri-strips.  Pt up and showered today.   still needs to see patient.  Will continue to monitor.

## 2018-02-18 NOTE — PLAN OF CARE
Problem: Patient Care Overview  Goal: Plan of Care/Patient Progress Review  Patient meeting expected goals for this shift.  Using tylenol & oxycodone for pain/comfort.  Independent in all self  cares.  Working on pumping and visiting  down in NICU.   present at bedside and supportive. Continue to monitor and notify MD as needed.

## 2018-02-18 NOTE — PLAN OF CARE
Problem: Patient Care Overview  Goal: Plan of Care/Patient Progress Review  Outcome: No Change  Patient doing well this shift. VSS. Fundus firm, midline, and U/1. Incision remains CDI.  Bleeding WDL, denies clots. Up and ambulating independently. Pumping. Pain being well controlled with PO meds. Will continue to monitor.

## 2018-02-18 NOTE — PROGRESS NOTES
"S: Pt doing well. Infant is being . Pain is well controlled.  Patient not in room so not examined     Baby in NICU    O: /79  Pulse 73  Temp 97.8  F (36.6  C) (Oral)  Resp 16  Ht 1.499 m (4' 11\")  Wt 68 kg (150 lb)  LMP 05/18/2017 (Exact Date)  SpO2 99%  Breastfeeding? Unknown  BMI 30.3 kg/m2                      Hemoglobin   Date Value Ref Range Status   02/17/2018 9.9 (L) 11.7 - 15.7 g/dL Final     Comment:     Delta Verified            Lab Results   Component Value Date    RH Pos 02/15/2018       A: Post-op Day #2 s/p C/Section   With preeclampsia      Normal blood pressures now     Off mag sulfate    P: Continue Post Op Cares    "

## 2018-02-18 NOTE — LACTATION NOTE
This note was copied from a baby's chart.  Pt is going to NICU to breast feed.  Did not pump overnight.  Will continue to follow.  Sunshine Haas  RN, IBCLC

## 2018-02-19 VITALS
HEART RATE: 83 BPM | BODY MASS INDEX: 28.06 KG/M2 | WEIGHT: 139.2 LBS | HEIGHT: 59 IN | RESPIRATION RATE: 16 BRPM | OXYGEN SATURATION: 99 % | SYSTOLIC BLOOD PRESSURE: 134 MMHG | DIASTOLIC BLOOD PRESSURE: 88 MMHG | TEMPERATURE: 98.7 F

## 2018-02-19 LAB — COPATH REPORT: NORMAL

## 2018-02-19 PROCEDURE — 25000132 ZZH RX MED GY IP 250 OP 250 PS 637: Performed by: OBSTETRICS & GYNECOLOGY

## 2018-02-19 RX ORDER — NIFEDIPINE 30 MG
30 TABLET, EXTENDED RELEASE ORAL DAILY
Qty: 30 TABLET | Refills: 1 | Status: SHIPPED | OUTPATIENT
Start: 2018-02-20 | End: 2018-04-09

## 2018-02-19 RX ORDER — OXYCODONE HYDROCHLORIDE 5 MG/1
5 TABLET ORAL EVERY 4 HOURS PRN
Qty: 30 TABLET | Refills: 0 | Status: SHIPPED | OUTPATIENT
Start: 2018-02-19 | End: 2018-03-05

## 2018-02-19 RX ORDER — NIFEDIPINE 30 MG/1
30 TABLET, EXTENDED RELEASE ORAL DAILY
Status: DISCONTINUED | OUTPATIENT
Start: 2018-02-19 | End: 2018-02-19 | Stop reason: HOSPADM

## 2018-02-19 RX ADMIN — OXYCODONE HYDROCHLORIDE 5 MG: 5 TABLET ORAL at 13:32

## 2018-02-19 RX ADMIN — SENNOSIDES AND DOCUSATE SODIUM 1 TABLET: 8.6; 5 TABLET ORAL at 11:10

## 2018-02-19 RX ADMIN — NIFEDIPINE 30 MG: 30 TABLET, FILM COATED, EXTENDED RELEASE ORAL at 08:09

## 2018-02-19 RX ADMIN — ACETAMINOPHEN 975 MG: 325 TABLET, FILM COATED ORAL at 03:57

## 2018-02-19 RX ADMIN — ACETAMINOPHEN 975 MG: 325 TABLET, FILM COATED ORAL at 13:32

## 2018-02-19 RX ADMIN — OXYCODONE HYDROCHLORIDE 5 MG: 5 TABLET ORAL at 00:02

## 2018-02-19 NOTE — PLAN OF CARE
"Problem: Patient Care Overview  Goal: Plan of Care/Patient Progress Review  Outcome: No Change  Patient resting comfortably. Blood pressures remain elevated. Started on medication for blood pressure this morning. Denies any s/s of preeclampsia. Pain well controlled with oral medication. Patient reports feeling \"good.\" Visits infant in NICU frequently. Encouraged to pump every 2-3 hours. All questions answered. Encouraged to call with needs and questions.       "

## 2018-02-19 NOTE — PLAN OF CARE
Problem: Patient Care Overview  Goal: Plan of Care/Patient Progress Review  Patient meeting expected goals for this shift.  Using tylenol and oxycodone for pain/comfort.  Blood pressures increasing through out the day - see flowsheet.  Patient denies vision changes, headache, epigastric pain.  Independent in all self cares.  Working on pumping and visiting  down in NICU.   present at bedside and supportive.  Continue to monitor and notify MD as needed.

## 2018-02-19 NOTE — PROGRESS NOTES
COPIED FROM BABY CHART:    Met with: CANDACE AND SYDNEE    Active Problems:    Liveborn by      hypoglycemia       DATA  Identified issues/concerns regarding health management: No health insurance for baby.   Baby is second child born to Rakan. CANDACE has health insurance through her employer. Parents state they cannot afford health insurance for both children and FOB, and that they are able to get family insurance through MOB's insurance although the cost is too high.  ASSESSMENT  Cognitive Status:  awake, alert and oriented  Concerns to be addressed: Lack of health insurance .  SW spoke to MOB and FOB, offering assistance with [place] for insurance. FOB states previously their family income is over eligibility guidelines in the past but will check again now that family is a size of 4.   PLAN  Financial costs for the patient includes : No health insurance currently .  Patient given options and choices for discharge : Offered visit by Financial Counselor which FOSHAMIKA and CANDACE accepted .  Patient/family is agreeable to the plan?  Yes: Referral to Fin Counselor made via voicemail.  Patient Goals and Preferences: Home with family. Continue to pursue insurance options.  Patient anticipates discharging to:  Home with family .

## 2018-02-19 NOTE — PROGRESS NOTES
[facility] Obstetrics Post-Op / Progress Note         Assessment and Plan:    Assessment:   Post-operative day #3  Low transverse repeat  section  L&D complications: GDM on insulin. Preeclampsia with severe features. Prior  Section with arrest of dilation at 8cm. Occiput posterior presentation      Doing well.  Clean wound without signs of infection.  BP within range of needing treatment. Appropriate response on nifedipine      Plan:   Discharge later today with nifedipine  Return to clinic in 1 week for BP check  Advised to stop ASA, Macrobid and insulin but to continue to follow GDM diet. Will screen at her PP visit for DM           Interval History:   Passing flatus. Has some left sided intermittent back pain. Has not been sleeping well. Otherwise tolerating diet. No emesis. No HA or visual changes.          Significant Problems:      Patient Active Problem List   Diagnosis     Supervision of high-risk pregnancy of elderly multigravida     History of gestational diabetes in prior pregnancy, currently pregnant     Hx of preeclampsia, prior pregnancy, currently pregnant, first trimester     H/O  section     Vaginal inclusion cyst     Insulin controlled gestational diabetes mellitus (GDM) in third trimester     Positive GBS test     Gestational diabetes mellitus      delivery delivered             Review of Systems:    The patient denies any chest pain, shortness of breath, excessive pain, fever, chills, purulent drainage from the wound, nausea or vomiting.          Medications:   All medications related to the patient's surgery have been reviewed          Physical Exam:     Patient Vitals for the past 12 hrs:   BP Temp Temp src Pulse Resp Weight   18 0809 (!) 143/99 98.7  F (37.1  C) Oral 66 16 -   18 0557 - - - - - 139 lb 3.2 oz (63.1 kg)   18 0357 (!) 150/101 97.9  F (36.6  C) Oral 63 16 -   18 0003 148/90 98.7  F (37.1  C) Oral 64 16 -     GEN:  NAD  CV: RR  RESP: CTAB  GI: incision clean, dry and intact. No signs of infection. Normoactive bowel sounds  EXT: non-tender to palpation          Data:     Hemoglobin   Date Value Ref Range Status   02/17/2018 9.9 (L) 11.7 - 15.7 g/dL Final     Comment:     Delta Verified   02/16/2018 13.4 11.7 - 15.7 g/dL Final   02/16/2018 13.4 11.7 - 15.7 g/dL Final   02/15/2018 13.0 11.7 - 15.7 g/dL Final   02/15/2018 12.8 11.7 - 15.7 g/dL Final     No imaging studies have been ordered    Maria Ines Delong MD

## 2018-02-19 NOTE — PLAN OF CARE
Problem: Patient Care Overview  Goal: Plan of Care/Patient Progress Review  Outcome: Improving  Bps elevated, see flowsheets. Patient denies headache, blurred vision, epigastric pain. +2 reflexes, no clonus. Incision CDI. Fundus firm, scant flow. Patient not pumping overnight. Pain managed with tylenol & oxycodone prn. Questions and concerns addressed. Will continue to monitor.

## 2018-02-19 NOTE — DISCHARGE INSTRUCTIONS
Discharge Instructions for  Section ()  You had a  section, or . During the , your baby was delivered through a surgical incision in your stomach and uterus. Full recovery after a  can take time. It s important to take care of yourself -- for your own sake and because your new baby needs you. Here are some guidelines to follow at home.  Incision care  Here's how to take care of your incision:    Shower as needed. Pat your incision dry.    Watch your incision for signs of infection, like increasing redness or drainage.    Hold a pillow against the incision when you laugh or cough and when you get up from a lying or sitting position.    Remember, it can take as long as 6 weeks for a  incision to heal.  Activity  Here are some suggestions:    Don t try to take care of anyone other than your baby and yourself.    Remember, the more active you are, the more likely you are to have an increase in your bleeding.    Get lots of rest. Take naps in the afternoon.    Increase your activities gradually.    Plan your activities so that you don t have to go up or down stairs more than necessary.    Do postsurgical deep breathing and coughing exercises. Ask your healthcare provider for instructions.    Don t lift anything heavier than your baby until your healthcare provider tells you it s OK.    Don t drive until your healthcare provider says it s OK.    Don t have sexual intercourse until after you ve had a follow-up appointment with your healthcare provider and you have decided on a birth control method.  Follow-up  Make a follow-up appointment as directed by our staff.  When to call your healthcare provider  Call your healthcare provider right away if you have any of the following:    Fever of 100.4 F (38 C) or higher    Redness, pain, or drainage at your incision site    Bleeding that requires a new sanitary pad every hour    Severe pain in the abdomen    Pain or  urgency with urination    Foul odor from vaginal discharge    Trouble urinating or emptying your bladder    No bowel movement within 1 week after the birth of your baby    Swollen, red, painful area in the leg    Appearance of rash or hives    Sore, red, painful area on the breasts that may be accompanied by flu-like symptoms    Feelings of anxiety, panic, and/or depression   Date Last Reviewed: 8/16/2015 2000-2017 [employer]. [address] [place] [address], [place] [address]. All rights reserved. This information is not intended as a substitute for professional medical care. Always follow your healthcare professional's instructions.

## 2018-02-19 NOTE — DISCHARGE SUMMARY
OBSTETRIC DISCHARGE SUMMARY    Admission Date: 2018  Discharge Date: 2018    Admission Diagnosis: Term intrauterine Pregnancy at 39 weeks. Prior  Section. Desires TOLAC. Advanced Maternal Age. Preeclampsia with severe features of thrombocytopenia. Gestational Diabetes Mellitus on Insulin  Discharge Diagnosis: Same as Preoperative Diagnosis including Postpartum    Procedures: Repeat Low Transverse  Section    Consults: none    Hospital Course: Rosmery is a 37 year old  was admitted for scheduled induction of labor for gestational diabetes  Mellitus on insulin. Upon presentation to L&D she was found to be hypertensive in the mild range. Her labs ruled her in for preeclampsia and she was also found to have new onset thrombocytopenia to a susannah of 67. They were offered a repeat  section but  Opted for a trial of labor after  section and induction. She was induced with a Cook Balloon and amniotomy and oxytocin. She progressed to 8cm. With no further cervical change an IUPC was placed and with adequate contractions and no further cervical dilation she was consented for a repeat  section. This was performed under general anesthesia due to the thrombocytopenia.  Delivery was uncomplicated. Postpartum course was uncomplicated. Baby's course was complicated by  hypogylecmia and admission to the NICU. Rosmery had persistent blood pressures in the 150s therefore she was started on oral long acting nifedipine and discharge home on this medication. By POD#3 she was ready for discharge. She is breast feeding, pain controlled. Her Hgb was 9.9. She was discharged home in good condition. She has been instructed to follow up with her doctor in clinic in 1 week for a blood pressure check. Discharge precautions were reviewed. If she has any concerns, she has been directed to call the clinic.     Maria Ines Delong MD  2018  9:03 AM

## 2018-02-19 NOTE — PLAN OF CARE
Dr. Delong called and notified of patient's blood pressure after receiving Nifedipine this morning as ordered. Okay to discharge patient home and follow up in 1 week for a nurse visit to check BP. If OB homecare sees patient postpartum, please check blood pressure and call if it is greater than 160/110. No need to arrange for visit specifically for this reason.

## 2018-02-22 RX ORDER — BREAST PUMP
1 EACH MISCELLANEOUS
Qty: 1 EACH | Refills: 0 | Status: SHIPPED | OUTPATIENT
Start: 2018-02-22 | End: 2018-03-06

## 2018-02-28 ENCOUNTER — ALLIED HEALTH/NURSE VISIT (OUTPATIENT)
Dept: NURSING | Facility: CLINIC | Age: 38
End: 2018-02-28
Payer: COMMERCIAL

## 2018-02-28 VITALS — HEART RATE: 96 BPM | DIASTOLIC BLOOD PRESSURE: 76 MMHG | SYSTOLIC BLOOD PRESSURE: 118 MMHG

## 2018-02-28 PROCEDURE — 99207 ZZC NO CHARGE NURSE ONLY: CPT

## 2018-02-28 NOTE — NURSING NOTE
Pt here for BPcheck. Pt had c/section 2/15/18 for preeclampsia. Pt denies HA, blurred vision, epigastric pain, SOB or N&V. Pt has 1+ pitting edema in lower legs. Hands also have 1+ edema. Pt is currently on Nifedipine 30 mg XL qd. /80 Pulse 96 right arm and 118/76 on left arm. Reviewed by Dr. Delaney. Pt Can stop BP med. Pt needs to check BP this week and call with numbers. Pt will also come back next week to have BP check. Pt should see Dr. Delong in 4 weeks. Pt informed. Pt will go to drug store to have BP checked and make appointment for next week BP check. Pt stated Dr. Delong told her to come back in about 2 weeks to see pt. Pt would like to confirm with Dr. Delong when she should be seen. Routing to Dr. Delong. When do you want to see pt?   Pt understands we will call her on Monday with information.

## 2018-03-02 ENCOUNTER — TELEPHONE (OUTPATIENT)
Dept: OBGYN | Facility: CLINIC | Age: 38
End: 2018-03-02

## 2018-03-02 ENCOUNTER — ALLIED HEALTH/NURSE VISIT (OUTPATIENT)
Dept: FAMILY MEDICINE | Facility: CLINIC | Age: 38
End: 2018-03-02
Payer: COMMERCIAL

## 2018-03-02 VITALS — HEART RATE: 76 BPM | SYSTOLIC BLOOD PRESSURE: 138 MMHG | DIASTOLIC BLOOD PRESSURE: 93 MMHG

## 2018-03-02 DIAGNOSIS — Z01.30 BP CHECK: Primary | ICD-10-CM

## 2018-03-02 PROCEDURE — 99207 ZZC NO CHARGE NURSE ONLY: CPT

## 2018-03-02 NOTE — TELEPHONE ENCOUNTER
Patient calling in with her BP reading from last night: 146/103. She came off the nifedipine on 2/28/18 when she called in and her readings were normal.      Per telephone note from 2/28:  Nursing Note   Winifred Cheng, RN (Registered Nurse)      Pt here for BPcheck. Pt had c/section 2/15/18 for preeclampsia. Pt denies HA, blurred vision, epigastric pain, SOB or N&V. Pt has 1+ pitting edema in lower legs. Hands also have 1+ edema. Pt is currently on Nifedipine 30 mg XL qd. /80 Pulse 96 right arm and 118/76 on left arm. Reviewed by Dr. Delaney. Pt Can stop BP med. Pt needs to check BP this week and call with numbers. Pt will also come back next week to have BP check. Pt should see Dr. Delong in 4 weeks. Pt informed. Pt will go to drug store to have BP checked and make appointment for next week BP check. Pt stated Dr. Delong told her to come back in about 2 weeks to see pt. Pt would like to confirm with Dr. Delong when she should be seen. Routing to Dr. Delong. When do you want to see pt?   Pt understands we will call her on Monday with information.

## 2018-03-02 NOTE — MR AVS SNAPSHOT
After Visit Summary   3/2/2018    Rosmery Garcia    MRN: [id number]           Patient Information     Date Of Birth          1980        Visit Information        Provider Department      3/2/2018 6:11 PM Clinic, [facility]        Today's Diagnoses     BP check    -  1       Follow-ups after your visit        Your next 10 appointments already scheduled     Mar 07, 2018  7:45 AM CST   Nurse Only with WE TRIAGE   [facility]    [address]  [facility] [id number] [phone]            Mar 12, 2018  8:30 AM CDT   Office Visit with Maria Ines Delong MD   [facility] ([facility]    [address]  [facility] [address]   [phone]           Bring a current list of meds and any records pertaining to this visit. For Physicals, please bring immunization records and any forms needing to be filled out. Please arrive 10 minutes early to complete paperwork.              Who to contact     If you have questions or need follow up information about today's clinic visit or your schedule please contact [facility] directly at [phone].  Normal or non-critical lab and imaging results will be communicated to you by DirectMoneyhart, letter or phone within 4 business days after the clinic has received the results. If you do not hear from us within 7 days, please contact the clinic through DirectMoneyhart or phone. If you have a critical or abnormal lab result, we will notify you by phone as soon as possible.  Submit refill requests through [email or web address] or call your pharmacy and they will forward the refill request to us. Please allow 3 business days for your refill to be completed.          Additional Information About Your Visit        [email or web address] Information     [email or web address] gives you secure access to your electronic health  record. If you see a primary care provider, you can also send messages to your care team and make appointments. If you have questions, please call your primary care clinic.  If you do not have a primary care provider, please call [phone] and they will assist you.        Care EveryWhere ID     This is your Care EveryWhere ID. This could be used by other organizations to access your [place] medical records  [id number]        Your Vitals Were     Pulse Last Period                76 05/18/2017 (Exact Date)           Blood Pressure from Last 3 Encounters:   03/06/18 108/78   03/05/18 (!) 146/98   03/02/18 (!) 138/93    Weight from Last 3 Encounters:   03/06/18 131 lb (59.4 kg)   02/19/18 139 lb 3.2 oz (63.1 kg)   02/12/18 155 lb (70.3 kg)              Today, you had the following     No orders found for display       Primary Care Provider Office Phone # Fax #    [facility] [phone]       [address]  [facility] [address]        Equal Access to Services     FLORENCE FREIRE AH: Hadii aad ku hadasho Soomaali, waaxda luqadaha, qaybta kaalmada adeegyada, waxay willin hayrichn alexia baig . So [facility] [phone].    ATENCIÓN: Si habla español, tiene a thompson disposición servicios gratuitos de asistencia lingüística. Llame al [phone].    We comply with applicable federal civil rights laws and [place] laws. We do not discriminate on the basis of race, color, national origin, age, disability, sex, sexual orientation, or gender identity.            Thank you!     Thank you for choosing [facility]  for your care. Our goal is always to provide you with excellent care. Hearing back from our patients is one way we can continue to improve our services. Please take a few minutes to complete the written survey that you may receive in the mail after your visit with us. Thank you!             Your Updated Medication List -  Protect others around you: Learn how to safely use, store and throw away your medicines at www.disposemymeds.org.          This list is accurate as of 3/2/18 11:59 PM.  Always use your most recent med list.                   Brand Name Dispense Instructions for use Diagnosis    NIFEdipine ER 30 MG Tb24    ADALAT CC    30 tablet    Take 1 tablet (30 mg) by mouth daily    Pre-eclampsia in third trimester       prenatal multivitamin plus iron 27-0.8 MG Tabs per tablet      Take 1 tablet by mouth daily

## 2018-03-02 NOTE — TELEPHONE ENCOUNTER
Spoke with Dr. Smiley, she said she would like to have a BP today. Patient will go to [place] [facility], the pharmacy was given the pager number for on-call Dr. Dixon. If she cannot go tonight she is aware they are open tomorrow. She also is to come Monday morning for BP check. Dr. Dixon is aware.

## 2018-03-03 NOTE — PROGRESS NOTES
Rosmery Garcia is enrolled/participating in the retail pharmacy Blood Pressure Goals Achievement Program (BPGAP).  Rosmery Garcia was evaluated at [facility] on March 2, 2018 at which time her blood pressure was:    BP Readings from Last 3 Encounters:   03/02/18 (!) 138/93   02/28/18 118/76   02/19/18 134/88     Reviewed lifestyle modifications for blood pressure control and reduction: including making healthy food choices, managing weight, getting regular exercise, smoking cessation, reducing alcohol consumption, monitoring blood pressure regularly.     Rosmery Garcia is not experiencing symptoms.    Follow-Up: BP is not at goal of < 140/90mmHg (patient 18+ years of age with or without diabetes), Recommended follow-up with PCP.  Routing to PCP for further review.    Recommendation to Provider: none - RN called pharmacy and advised pt was coming in to check BP.  Will route to provider as BP was high 3/1/18.  (pt feeling fine with no symptoms 3/2/18 - recommended to pt to drink more water and to come back in to recheck if needed).    Rosmery Garcia was evaluated for enrollment into the PGEN study today.    Patient eligible for enrollment:  No  Patient interested in enrollment:  No    Completed by: Hilda Shearer, RENATO.Ph.    [facility] (#[facility]) [address] [place] [address]    Phone: [phone]    Fax: [phone]

## 2018-03-05 ENCOUNTER — ALLIED HEALTH/NURSE VISIT (OUTPATIENT)
Dept: NURSING | Facility: CLINIC | Age: 38
End: 2018-03-05
Payer: COMMERCIAL

## 2018-03-05 VITALS — DIASTOLIC BLOOD PRESSURE: 98 MMHG | SYSTOLIC BLOOD PRESSURE: 146 MMHG | HEART RATE: 83 BPM

## 2018-03-05 DIAGNOSIS — Z98.891 H/O CESAREAN SECTION: ICD-10-CM

## 2018-03-05 PROCEDURE — 99207 ZZC NO CHARGE NURSE ONLY: CPT

## 2018-03-05 RX ORDER — ACETAMINOPHEN 500 MG
1000 TABLET ORAL EVERY 8 HOURS PRN
Qty: 90 TABLET | Refills: 0 | Status: SHIPPED | OUTPATIENT
Start: 2018-03-05 | End: 2018-04-09

## 2018-03-05 RX ORDER — IBUPROFEN 600 MG/1
600 TABLET, FILM COATED ORAL EVERY 6 HOURS PRN
Qty: 90 TABLET | Refills: 0 | Status: SHIPPED | OUTPATIENT
Start: 2018-03-05 | End: 2018-04-09

## 2018-03-05 RX ORDER — OXYCODONE HYDROCHLORIDE 5 MG/1
5 TABLET ORAL EVERY 4 HOURS PRN
Qty: 20 TABLET | Refills: 0 | Status: SHIPPED | OUTPATIENT
Start: 2018-03-05 | End: 2018-04-09

## 2018-03-05 NOTE — MR AVS SNAPSHOT
After Visit Summary   3/5/2018    Rosmery Garcia    MRN: [id number]           Patient Information     Date Of Birth          1980        Visit Information        Provider Department      3/5/2018 7:45 AM [employer]; [facility] [place]        Today's Diagnoses     H/O  section           Follow-ups after your visit        Your next 10 appointments already scheduled     Mar 07, 2018  7:45 AM CST   Nurse Only with WE TRIAGE   [facility] [place] ([facility] [place])    [address]  [place] [address] [phone]            Mar 12, 2018  8:30 AM CDT   Office Visit with Maria Ines Delong MD   [facility] [place] ([facility] [place])    [address]  [place] [address]   [phone]           Bring a current list of meds and any records pertaining to this visit. For Physicals, please bring immunization records and any forms needing to be filled out. Please arrive 10 minutes early to complete paperwork.              Who to contact     If you have questions or need follow up information about today's clinic visit or your schedule please contact [facility] [place] directly at [phone].  Normal or non-critical lab and imaging results will be communicated to you by apstratahart, letter or phone within 4 business days after the clinic has received the results. If you do not hear from us within 7 days, please contact the clinic through apstratahart or phone. If you have a critical or abnormal lab result, we will notify you by phone as soon as possible.  Submit refill requests through Framehawk or call your pharmacy and they will forward the refill request to us. Please allow 3 business days for your refill to be completed.          Additional Information About Your Visit        Framehawk Information     Framehawk gives you secure access to  your electronic health record. If you see a primary care provider, you can also send messages to your care team and make appointments. If you have questions, please call your primary care clinic.  If you do not have a primary care provider, please call [phone] and they will assist you.        Care EveryWhere ID     This is your Care EveryWhere ID. This could be used by other organizations to access your [facility] medical records  [id number]        Your Vitals Were     Pulse Last Period                83 2017 (Exact Date)           Blood Pressure from Last 3 Encounters:   18 (!) 146/98   18 (!) 138/93   18 118/76    Weight from Last 3 Encounters:   18 139 lb 3.2 oz (63.1 kg)   18 155 lb (70.3 kg)   18 153 lb (69.4 kg)              Today, you had the following     No orders found for display         Today's Medication Changes          These changes are accurate as of 3/5/18  8:47 AM.  If you have any questions, ask your nurse or doctor.               Start taking these medicines.        Dose/Directions    acetaminophen 500 MG tablet   Commonly known as:  TYLENOL   Used for:  H/O  section        Dose:  1000 mg   Take 2 tablets (1,000 mg) by mouth every 8 hours as needed for mild pain   Quantity:  90 tablet   Refills:  0       ibuprofen 600 MG tablet   Commonly known as:  ADVIL/MOTRIN   Used for:  H/O  section        Dose:  600 mg   Take 1 tablet (600 mg) by mouth every 6 hours as needed for moderate pain   Quantity:  90 tablet   Refills:  0            Where to get your medicines      These medications were sent to [facility]/pharmacy #[id number] [place], [place] - [address] [address]     Phone:  [phone]     acetaminophen 500 MG tablet    ibuprofen 600 MG tablet         Some of these will need a paper prescription and others can be bought over the counter.  Ask your nurse if you have questions.     Bring a paper  prescription for each of these medications     oxyCODONE IR 5 MG tablet                Primary Care Provider Office Phone # Fax #    [facility] [phone]       [facility] [address]  [facility] [address]        Equal Access to Services     FLORENCE FREIRE : Hadii aad ku hadasho Soomaali, waaxda luqadaha, qaybta kaalmada adeegyada, waxke pinedo hayrichn alexia shayshainaleelee walsh. So [facility] [phone].    ATENCIÓN: Si habla español, tiene a thompson disposición servicios gratuitos de asistencia lingüística. Jamia al [phone].    We comply with applicable federal civil rights laws and [place] laws. We do not discriminate on the basis of race, color, national origin, age, disability, sex, sexual orientation, or gender identity.            Thank you!     Thank you for choosing [facility]  for your care. Our goal is always to provide you with excellent care. Hearing back from our patients is one way we can continue to improve our services. Please take a few minutes to complete the written survey that you may receive in the mail after your visit with us. Thank you!             Your Updated Medication List - Protect others around you: Learn how to safely use, store and throw away your medicines at www.disposemymeds.org.          This list is accurate as of 3/5/18  8:47 AM.  Always use your most recent med list.                   Brand Name Dispense Instructions for use Diagnosis    acetaminophen 500 MG tablet    TYLENOL    90 tablet    Take 2 tablets (1,000 mg) by mouth every 8 hours as needed for mild pain    H/O  section       alcohol swab prep pads     100 each    Use to swab area of injection/lamar as directed.    Insulin controlled gestational diabetes mellitus (GDM) in third trimester       blood glucose monitoring lancets     200 each    Use to test blood sugar 4 times daily or as directed.  Ok to substitute  alternative if insurance prefers.    Gestational diabetes       blood glucose monitoring test strip    ONETOUCH VERIO IQ    150 strip    Use to test blood sugars 4 times daily or as directed.    Gestational diabetes       breast pump Misc     1 each    1 each every 3 hours    Lactating mother       ibuprofen 600 MG tablet    ADVIL/MOTRIN    90 tablet    Take 1 tablet (600 mg) by mouth every 6 hours as needed for moderate pain    H/O  section       insulin pen needle 32G X 4 MM    ULTICARE MICRO    100 each    Use 1 pen needles daily or as directed.    Insulin controlled gestational diabetes mellitus (GDM) in third trimester       NIFEdipine ER 30 MG Tb24    ADALAT CC    30 tablet    Take 1 tablet (30 mg) by mouth daily    Pre-eclampsia in third trimester       oxyCODONE IR 5 MG tablet    ROXICODONE    20 tablet    Take 1 tablet (5 mg) by mouth every 4 hours as needed for other (pain control or improvement in physical function. Hold dose for analgesic side effects.)    H/O  section       prenatal multivitamin plus iron 27-0.8 MG Tabs per tablet      Take 1 tablet by mouth daily

## 2018-03-05 NOTE — PROGRESS NOTES
Pt came in for BP check following  delivery 18. Pt had preeclampsia.   BP's remain elevated, see vitals tab. Pt states she stopped taking her nifedipine one week after delivery.  Pt denies headache, vision changes, upper stomach pain, chest pain, SOB, heart palpitations or swelling.   Upon exam: negative clonus, negative swelling, 0 reflexed bilaterally, pt denies calf pain.     Spoke to DR. Quiñones in person who stated pt to restart taking her nifedipine and to come in to see her in clinic next week.    Informed pt, she then requested a refill of her pain medication oxycodone for her  incisional pain. RN asked pt what she was taking for pain and she stated she was only using the oxycodone. Informed pt she needed to start rotating tylenol and ibuprofen and using oxycodone for only breakthrough pain. Pt requested and Rx for ibuprofen and tylenol as well.    Rx signed by Dr. Delong for her oxycodone and order placed by RN from verbal order for ibuprofen and tylenol.  Pt was brought to  to make appt for the next week with Dr. Delong, was informed to call if she develops headache, vision changes, upper stomach pain, chest pain, SOB, heart palpitations or swelling. Pt stated understanding and was discharged to home in stable condition.

## 2018-03-06 ENCOUNTER — OFFICE VISIT (OUTPATIENT)
Dept: OBGYN | Facility: CLINIC | Age: 38
End: 2018-03-06
Payer: COMMERCIAL

## 2018-03-06 ENCOUNTER — TELEPHONE (OUTPATIENT)
Dept: NURSING | Facility: CLINIC | Age: 38
End: 2018-03-06

## 2018-03-06 VITALS
WEIGHT: 131 LBS | DIASTOLIC BLOOD PRESSURE: 78 MMHG | SYSTOLIC BLOOD PRESSURE: 108 MMHG | HEIGHT: 59 IN | BODY MASS INDEX: 26.41 KG/M2

## 2018-03-06 DIAGNOSIS — R10.84 GENERALIZED ABDOMINAL PAIN: ICD-10-CM

## 2018-03-06 DIAGNOSIS — R59.0 LYMPHADENOPATHY, INGUINAL: Primary | ICD-10-CM

## 2018-03-06 DIAGNOSIS — N76.4 VULVAR ABSCESS: ICD-10-CM

## 2018-03-06 LAB
BACTERIA SPEC CULT: NORMAL
GRAM STN SPEC: ABNORMAL
GRAM STN SPEC: ABNORMAL
Lab: ABNORMAL
Lab: NORMAL
SPECIMEN SOURCE: ABNORMAL
SPECIMEN SOURCE: NORMAL

## 2018-03-06 PROCEDURE — 87077 CULTURE AEROBIC IDENTIFY: CPT | Performed by: OBSTETRICS & GYNECOLOGY

## 2018-03-06 PROCEDURE — 87491 CHLMYD TRACH DNA AMP PROBE: CPT | Performed by: OBSTETRICS & GYNECOLOGY

## 2018-03-06 PROCEDURE — 87186 SC STD MICRODIL/AGAR DIL: CPT | Performed by: OBSTETRICS & GYNECOLOGY

## 2018-03-06 PROCEDURE — 87205 SMEAR GRAM STAIN: CPT | Performed by: OBSTETRICS & GYNECOLOGY

## 2018-03-06 PROCEDURE — 99213 OFFICE O/P EST LOW 20 MIN: CPT | Mod: 24 | Performed by: OBSTETRICS & GYNECOLOGY

## 2018-03-06 PROCEDURE — 87591 N.GONORRHOEAE DNA AMP PROB: CPT | Performed by: OBSTETRICS & GYNECOLOGY

## 2018-03-06 PROCEDURE — 87070 CULTURE OTHR SPECIMN AEROBIC: CPT | Performed by: OBSTETRICS & GYNECOLOGY

## 2018-03-06 RX ORDER — CEPHALEXIN 500 MG/1
500 CAPSULE ORAL 2 TIMES DAILY
Qty: 20 CAPSULE | Refills: 0 | Status: SHIPPED | OUTPATIENT
Start: 2018-03-06 | End: 2018-04-09

## 2018-03-06 NOTE — TELEPHONE ENCOUNTER
2/16/18 C/section. Pt's  called and stated that pt has had pain in her incision since last night. Pt feels that incision is more swollen. Denies drainage but pt states it smells bad. Pt's  does not know about vag discharge. Pt did take oxycodone for pain last night. Has not tried Ibuprofen. Pt's  does not know about fever. Pt has been in tears from the pain.  Informed that pt needs to be seen. Transferred to scheduling to make appointment.

## 2018-03-06 NOTE — MR AVS SNAPSHOT
After Visit Summary   3/6/2018    Rosmery Garcia    MRN: [id number]           Patient Information     Date Of Birth          1980        Visit Information        Provider Department      3/6/2018 9:40 AM Maria Ines Delong MD; PHONE,  [facility]        Today's Diagnoses     Lymphadenopathy, inguinal    -  1    Vulvar abscess           Follow-ups after your visit        Your next 10 appointments already scheduled     Mar 19, 2018  8:40 AM CDT   [facility] Ines [facility]   [facility]    [address]  [facility] [address] [phone]           Bring a current list of meds and any records pertaining to this visit. For Physicals, please bring immunization records and any forms needing to be filled out. Please arrive 10 minutes early to complete paperwork.              Future tests that were ordered for you today     Open Future Orders        Priority Expected Expires Ordered    US Abdomen Limited Routine  3/6/2019 3/6/2018            Who to contact     If you have questions or need follow up information about today's clinic visit or your schedule please contact [facility] directly at [phone].  Normal or non-critical lab and imaging results will be communicated to you by MyChart, letter or phone within 4 business days after the clinic has received the results. If you do not hear from us within 7 days, please contact the clinic through Pikhubhart or phone. If you have a critical or abnormal lab result, we will notify you by phone as soon as possible.  Submit refill requests through BestVendor or call your pharmacy and they will forward the refill request to us. Please allow 3 business days for your refill to be completed.          Additional Information About Your Visit        PikhubharGlobeSherpa Information     BestVendor gives you secure access  "to your electronic health record. If you see a primary care provider, you can also send messages to your care team and make appointments. If you have questions, please call your primary care clinic.  If you do not have a primary care provider, please call [phone] and they will assist you.        Care EveryWhere ID     This is your Care EveryWhere ID. This could be used by other organizations to access your [facility] medical records  [id number]        Your Vitals Were     Height Last Period BMI (Body Mass Index)             4' 11\" (1.499 m) 05/18/2017 (Exact Date) 26.46 kg/m2          Blood Pressure from Last 3 Encounters:   03/06/18 108/78   03/05/18 (!) 146/98   03/02/18 (!) 138/93    Weight from Last 3 Encounters:   03/06/18 131 lb (59.4 kg)   02/19/18 139 lb 3.2 oz (63.1 kg)   02/12/18 155 lb (70.3 kg)              We Performed the Following     Chlamydia trachomatis PCR     Gram stain     Neisseria gonorrhoeae PCR     Wound Culture Aerobic Bacterial          Today's Medication Changes          These changes are accurate as of 3/6/18 10:51 AM.  If you have any questions, ask your nurse or doctor.               Start taking these medicines.        Dose/Directions    cephALEXin 500 MG capsule   Commonly known as:  KEFLEX   Used for:  Vulvar abscess   Started by:  Maria Ines Delong MD        Dose:  500 mg   Take 1 capsule (500 mg) by mouth 2 times daily   Quantity:  20 capsule   Refills:  0            Where to get your medicines      These medications were sent to [facility]/pharmacy #[id number] [facility] [address] [address]     Phone:  [phone]     cephALEXin 500 MG capsule                Primary Care Provider Office Phone # Fax #    [facility] [phone]       [facility] [address] [facility] [address]  [facility] [address]        Equal Access to Services     FLORENCE FREIER AH: Hadii aad ku " amairani Tinoco, wamynorda lujeimyadaha, qaybta katrini carrillo, selvin willin hayaan caitlinpat jaspaljonh lasnehaleleanor nico. So [facility] [phone].    ATENCIÓN: Si anjana elder, tiene a thompson disposición servicios gratuitos de asistencia lingüística. Jamia al [phone].    We comply with applicable federal civil rights laws and [place] laws. We do not discriminate on the basis of race, color, national origin, age, disability, sex, sexual orientation, or gender identity.            Thank you!     Thank you for choosing [facility] [place]  for your care. Our goal is always to provide you with excellent care. Hearing back from our patients is one way we can continue to improve our services. Please take a few minutes to complete the written survey that you may receive in the mail after your visit with us. Thank you!             Your Updated Medication List - Protect others around you: Learn how to safely use, store and throw away your medicines at www.disposemymeds.org.          This list is accurate as of 3/6/18 10:51 AM.  Always use your most recent med list.                   Brand Name Dispense Instructions for use Diagnosis    acetaminophen 500 MG tablet    TYLENOL    90 tablet    Take 2 tablets (1,000 mg) by mouth every 8 hours as needed for mild pain    H/O  section       cephALEXin 500 MG capsule    KEFLEX    20 capsule    Take 1 capsule (500 mg) by mouth 2 times daily    Vulvar abscess       ibuprofen 600 MG tablet    ADVIL/MOTRIN    90 tablet    Take 1 tablet (600 mg) by mouth every 6 hours as needed for moderate pain    H/O  section       NIFEdipine ER 30 MG Tb24    ADALAT CC    30 tablet    Take 1 tablet (30 mg) by mouth daily    Pre-eclampsia in third trimester       oxyCODONE IR 5 MG tablet    ROXICODONE    20 tablet    Take 1 tablet (5 mg) by mouth every 4 hours as needed for other (pain control or improvement in physical function. Hold dose for analgesic side effects.)    H/O  section        prenatal multivitamin plus iron 27-0.8 MG Tabs per tablet      Take 1 tablet by mouth daily

## 2018-03-06 NOTE — PROGRESS NOTES
"      SUBJECTIVE:                                                   Rosmery Garcia is a 37 year old female who presents to clinic today for the following health issue(s):  Patient presents with:  Surgical Followup: patient had  18 and had pain in abdominal area and was relieved with pain medication        HPI:  Rosmery presents with her  and her infant for several complaints. Firstly she states that she has increased pain above and below her incision. She states that sometimes has pain in her epigastrium as well. She denies any leakage of fluid from her incision. The pain is mostly from the right side. She has no vaginal complaints but states that she has a painful \"lump\" in her right groin that has been present for several days. She only shaved last night and not before. She denies nausea or vomiting and denies constipation or diarrhea.     Patient's last menstrual period was 2017 (exact date)..   Patient is not currently sexually active, .  Using none for contraception.    reports that she has never smoked. She has never used smokeless tobacco.  Health maintenance updated:  yes    Today's PHQ-2 Score: No flowsheet data found.  Today's PHQ-9 Score: No flowsheet data found.  Today's LAKESHIA-7 Score: No flowsheet data found.    Problem list and histories reviewed & adjusted, as indicated.  Additional history: as documented.    Patient Active Problem List   Diagnosis     Supervision of high-risk pregnancy of elderly multigravida     History of gestational diabetes in prior pregnancy, currently pregnant     Hx of preeclampsia, prior pregnancy, currently pregnant, first trimester     H/O  section     Vaginal inclusion cyst     Insulin controlled gestational diabetes mellitus (GDM) in third trimester     Positive GBS test     Gestational diabetes mellitus      delivery delivered     Past Surgical History:   Procedure Laterality Date      SECTION        " "SECTION N/A 2018    Procedure:  SECTION;;  Surgeon: Maria Ines Delong MD;  Location:  L+D      Social History   Substance Use Topics     Smoking status: Never Smoker     Smokeless tobacco: Never Used     Alcohol use No      Problem (# of Occurrences) Relation (Name,Age of Onset)    DIABETES (2) Mother, Brother            Current Outpatient Prescriptions   Medication Sig     cephALEXin (KEFLEX) 500 MG capsule Take 1 capsule (500 mg) by mouth 2 times daily     oxyCODONE IR (ROXICODONE) 5 MG tablet Take 1 tablet (5 mg) by mouth every 4 hours as needed for other (pain control or improvement in physical function. Hold dose for analgesic side effects.)     NIFEdipine ER osmotic (ADALAT CC) 30 MG TB24 Take 1 tablet (30 mg) by mouth daily     ibuprofen (ADVIL/MOTRIN) 600 MG tablet Take 1 tablet (600 mg) by mouth every 6 hours as needed for moderate pain (Patient not taking: Reported on 3/6/2018)     acetaminophen (TYLENOL) 500 MG tablet Take 2 tablets (1,000 mg) by mouth every 8 hours as needed for mild pain (Patient not taking: Reported on 3/6/2018)     Prenatal Vit-Fe Fumarate-FA (PRENATAL MULTIVITAMIN PLUS IRON) 27-0.8 MG TABS per tablet Take 1 tablet by mouth daily     No current facility-administered medications for this visit.      No Known Allergies    ROS:  12 point review of systems negative other than symptoms noted below.    OBJECTIVE:     /78  Ht 4' 11\" (1.499 m)  Wt 131 lb (59.4 kg)  LMP 2017 (Exact Date)  BMI 26.46 kg/m2  Body mass index is 26.46 kg/(m^2).    Exam:  Constitutional:  Appearance: Well nourished, well developed alert, in no acute distress  Chest:  Respiratory Effort:  Breathing unlabored  Gastrointestinal:  Incision is clean, dry and intact. There is some protruding monocryl on the right side of the incision that is removed. There is no separation. There is no induration or erythema. Abdomen is soft, non-distended without rebound or guarding. On palpation " there is minimal tenderness superior and inferior to the incision with no skin changes or erythema noted.  On palpation of her groin there is a tender 1.5 cm lymph node/mass on the right side. There is no adenopathy on the left side.   External Genitalia: no clear Bartholin's gland inflammation.  Prior inclusion cyst inferior to the urethra in no longer present. On palpation of the right Bartholin's gland and on palpation of the right labia medially there is extravasation of copious amounts of yellow colored fluid from just right of an inferior to the urethral opening.   Swabs for wound culture as well as gonorrhea and chlamydia cultures were collected of this fluid.    In-Clinic Test Results:  No results found for this or any previous visit (from the past 24 hour(s)).    ASSESSMENT/PLAN:                                                        ICD-10-CM    1. Lymphadenopathy, inguinal R59.0 US Abdomen Limited   2. Vulvar abscess N76.4 Chlamydia trachomatis PCR     Neisseria gonorrhoeae PCR     Wound Culture Aerobic Bacterial     Gram stain     cephALEXin (KEFLEX) 500 MG capsule   3. Generalized abdominal pain R10.84      Incisional pain: hyperesthesia is likely due to regenerating nerves. There is no concern for post-op wound infection today.  Abdominal pain: likely dyspepsia  Right inguinal lymphadenopathy: likely reactive from apparent abscess on the right side. Will prescribe Keflex and have her follow up in 10 days for repeat exam. If her inguinal lymphadenopathy is still present after antibiotics I will send her for an abdominal ultrasound.    Maria Ines Delong MD  [facility]

## 2018-03-07 LAB
C TRACH DNA SPEC QL NAA+PROBE: NEGATIVE
N GONORRHOEA DNA SPEC QL NAA+PROBE: NEGATIVE
SPECIMEN SOURCE: NORMAL
SPECIMEN SOURCE: NORMAL

## 2018-03-09 LAB
BACTERIA SPEC CULT: ABNORMAL
Lab: ABNORMAL
SPECIMEN SOURCE: ABNORMAL

## 2018-03-19 ENCOUNTER — OFFICE VISIT (OUTPATIENT)
Dept: OBGYN | Facility: CLINIC | Age: 38
End: 2018-03-19
Payer: COMMERCIAL

## 2018-03-19 VITALS — SYSTOLIC BLOOD PRESSURE: 110 MMHG | WEIGHT: 132 LBS | DIASTOLIC BLOOD PRESSURE: 78 MMHG | BODY MASS INDEX: 26.66 KG/M2

## 2018-03-19 DIAGNOSIS — N76.4 VULVAR ABSCESS: Primary | ICD-10-CM

## 2018-03-19 PROCEDURE — 99207 ZZC POST PARTUM EXAM: CPT | Performed by: OBSTETRICS & GYNECOLOGY

## 2018-03-19 NOTE — MR AVS SNAPSHOT
After Visit Summary   3/19/2018    Rosmery Garcia    MRN: [id number]           Patient Information     Date Of Birth          1980        Visit Information        Provider Department      3/19/2018 8:40 AM Maria Ines Delong MD [facility] [place]         Follow-ups after your visit        Your next 10 appointments already scheduled     Apr 09, 2018  8:30 AM CDT   Post Partum with Maria Ines Delong MD   [facility] [place] ([facility])    [address] [place] [address]   [phone]              Who to contact     If you have questions or need follow up information about today's clinic visit or your schedule please contact [facility] directly at [phone].  Normal or non-critical lab and imaging results will be communicated to you by MyChart, letter or phone within 4 business days after the clinic has received the results. If you do not hear from us within 7 days, please contact the clinic through [email or web address] or phone. If you have a critical or abnormal lab result, we will notify you by phone as soon as possible.  Submit refill requests through [employer] or call your pharmacy and they will forward the refill request to us. Please allow 3 business days for your refill to be completed.          Additional Information About Your Visit        MyChart Information     [employer] gives you secure access to your electronic health record. If you see a primary care provider, you can also send messages to your care team and make appointments. If you have questions, please call your primary care clinic.  If you do not have a primary care provider, please call [phone] and they will assist you.        Care EveryWhere ID     This is your Care EveryWhere ID. This could be used by other organizations to access your [place] medical records  [id number]        Your Vitals Were     Last Period  Breastfeeding? BMI (Body Mass Index)             05/18/2017 (Exact Date) Yes 26.66 kg/m2          Blood Pressure from Last 3 Encounters:   03/19/18 110/78   03/06/18 108/78   03/05/18 (!) 146/98    Weight from Last 3 Encounters:   03/19/18 132 lb (59.9 kg)   03/06/18 131 lb (59.4 kg)   02/19/18 139 lb 3.2 oz (63.1 kg)              Today, you had the following     No orders found for display       Primary Care Provider Office Phone # Fax #    [facility] [phone]       [facility] [address]  [facility] [address]  [facility] [address]        Equal Access to Services     FLORENCE FREIRE : Hadii jefe ngoo Somichael, waaxda luqadaha, qaybta kaalmada adeegyada, selvin baig . So [facility] [phone].    ATENCIÓN: Si habla español, tiene a thompson disposición servicios gratuitos de asistencia lingüística. Llame al [phone].    We comply with applicable federal civil rights laws and [place] laws. We do not discriminate on the basis of race, color, national origin, age, disability, sex, sexual orientation, or gender identity.            Thank you!     Thank you for choosing [facility]  for your care. Our goal is always to provide you with excellent care. Hearing back from our patients is one way we can continue to improve our services. Please take a few minutes to complete the written survey that you may receive in the mail after your visit with us. Thank you!             Your Updated Medication List - Protect others around you: Learn how to safely use, store and throw away your medicines at www.disposemymeds.org.          This list is accurate as of 3/19/18  9:16 AM.  Always use your most recent med list.                   Brand Name Dispense Instructions for use Diagnosis    acetaminophen 500 MG tablet    TYLENOL    90 tablet    Take 2 tablets (1,000 mg) by mouth every 8 hours as needed for mild pain    H/O   section       cephALEXin 500 MG capsule    KEFLEX    20 capsule    Take 1 capsule (500 mg) by mouth 2 times daily    Vulvar abscess       ibuprofen 600 MG tablet    ADVIL/MOTRIN    90 tablet    Take 1 tablet (600 mg) by mouth every 6 hours as needed for moderate pain    H/O  section       NIFEdipine ER 30 MG Tb24    ADALAT CC    30 tablet    Take 1 tablet (30 mg) by mouth daily    Pre-eclampsia in third trimester       oxyCODONE IR 5 MG tablet    ROXICODONE    20 tablet    Take 1 tablet (5 mg) by mouth every 4 hours as needed for other (pain control or improvement in physical function. Hold dose for analgesic side effects.)    H/O  section       prenatal multivitamin plus iron 27-0.8 MG Tabs per tablet      Take 1 tablet by mouth daily

## 2018-03-19 NOTE — PROGRESS NOTES
SUBJECTIVE:                                                   Rosmery Garcia is a 37 year old female who presents to clinic today for the following health issue(s):  Patient presents with:  RECHECK: f/u to csection incision        HPI:  Doing well. Abdominal pain is improved. Still has numbness around incision. Inguinal discomfort is improved. Finished the antibiotics. Has been using warm compresses with continued discharge.    Patient's last menstrual period was 2017 (exact date)..   Patient is not sexually active, .  Using not sexually active for contraception.    reports that she has never smoked. She has never used smokeless tobacco.    STD testing offered?  Declined    Health maintenance updated:  yes    Today's PHQ-2 Score: No flowsheet data found.  Today's PHQ-9 Score: No flowsheet data found.  Today's LAKESHIA-7 Score: No flowsheet data found.    Problem list and histories reviewed & adjusted, as indicated.  Additional history: as documented.    Patient Active Problem List   Diagnosis     Supervision of high-risk pregnancy of elderly multigravida     History of gestational diabetes in prior pregnancy, currently pregnant     Hx of preeclampsia, prior pregnancy, currently pregnant, first trimester     H/O  section     Vaginal inclusion cyst     Insulin controlled gestational diabetes mellitus (GDM) in third trimester     Positive GBS test     Gestational diabetes mellitus      delivery delivered     Past Surgical History:   Procedure Laterality Date      SECTION        SECTION N/A 2018    Procedure:  SECTION;;  Surgeon: Maria Ines Delong MD;  Location:  L+D      Social History   Substance Use Topics     Smoking status: Never Smoker     Smokeless tobacco: Never Used     Alcohol use No      Problem (# of Occurrences) Relation (Name,Age of Onset)    DIABETES (2) Mother, Brother            Current Outpatient Prescriptions   Medication  Sig     cephALEXin (KEFLEX) 500 MG capsule Take 1 capsule (500 mg) by mouth 2 times daily     oxyCODONE IR (ROXICODONE) 5 MG tablet Take 1 tablet (5 mg) by mouth every 4 hours as needed for other (pain control or improvement in physical function. Hold dose for analgesic side effects.)     ibuprofen (ADVIL/MOTRIN) 600 MG tablet Take 1 tablet (600 mg) by mouth every 6 hours as needed for moderate pain     acetaminophen (TYLENOL) 500 MG tablet Take 2 tablets (1,000 mg) by mouth every 8 hours as needed for mild pain     NIFEdipine ER osmotic (ADALAT CC) 30 MG TB24 Take 1 tablet (30 mg) by mouth daily     Prenatal Vit-Fe Fumarate-FA (PRENATAL MULTIVITAMIN PLUS IRON) 27-0.8 MG TABS per tablet Take 1 tablet by mouth daily     No current facility-administered medications for this visit.      No Known Allergies    ROS:  12 point review of systems negative other than symptoms noted below.    OBJECTIVE:     /78  Wt 132 lb (59.9 kg)  LMP 05/18/2017 (Exact Date)  Breastfeeding? Yes  BMI 26.66 kg/m2  Body mass index is 26.66 kg/(m^2).    Exam:  Constitutional:  Appearance: Well nourished, well developed alert, in no acute distress  Gastrointestinal:  Abdominal Examination:  Incision is well healed. No erythema or induration. Abdomen nontender to palpation, tone normal without rigidity or guarding, no masses present, umbilicus without lesions; Liver/Spleen:  No hepatomegaly present, liver nontender to palpation; Hernias:  No hernias present  External Genitalia: No inguinal lymphadenopathy bilaterally. Bartholin's gland is not enlarged. On palpation of the right labia there is extravasation of thin  Yellow fluid. Not as copious as the prior visit. There is an abrasion noted at the posterior fourchette.    In-Clinic Test Results:  No results found for this or any previous visit (from the past 24 hour(s)).    ASSESSMENT/PLAN:                                                        ICD-10-CM    1. Vulvar abscess N76.4       Rosmery is improving. She was advised to continue the warm compresses on her vulva. There is no induration that is amenable to drainage at this point. She has completed her antibiotic regimen but she was advised to continue the warm compresses until there is no more fluid expressed. If the swelling returns, will perform an incision and drainage at that time and may pursue imaging prior to I&D given the disappearance of her vaginal inclusion cyst.    No concern for  section wound infection today.     Normal blood pressure therefore Rosmery was advised to stop her antihypertensive.   She was asked to follow up in 3 weeks for her PP visit. She will need a 2hr GTT then.    Maria Ines Delong MD  [facility] [place]

## 2018-04-09 ENCOUNTER — PRENATAL OFFICE VISIT (OUTPATIENT)
Dept: OBGYN | Facility: CLINIC | Age: 38
End: 2018-04-09
Payer: COMMERCIAL

## 2018-04-09 VITALS — DIASTOLIC BLOOD PRESSURE: 88 MMHG | SYSTOLIC BLOOD PRESSURE: 134 MMHG | BODY MASS INDEX: 27.47 KG/M2 | WEIGHT: 136 LBS

## 2018-04-09 DIAGNOSIS — Z86.32 HX GESTATIONAL DIABETES: ICD-10-CM

## 2018-04-09 PROBLEM — N89.8 VAGINAL INCLUSION CYST: Status: RESOLVED | Noted: 2017-11-02 | Resolved: 2018-04-09

## 2018-04-09 PROBLEM — B95.1 POSITIVE GBS TEST: Status: RESOLVED | Noted: 2018-01-22 | Resolved: 2018-04-09

## 2018-04-09 PROBLEM — O09.299 HISTORY OF GESTATIONAL DIABETES IN PRIOR PREGNANCY, CURRENTLY PREGNANT: Status: RESOLVED | Noted: 2017-08-22 | Resolved: 2018-04-09

## 2018-04-09 PROBLEM — O09.529 SUPERVISION OF HIGH-RISK PREGNANCY OF ELDERLY MULTIGRAVIDA: Status: RESOLVED | Noted: 2017-08-22 | Resolved: 2018-04-09

## 2018-04-09 PROCEDURE — 99207 ZZC POST PARTUM EXAM: CPT | Performed by: OBSTETRICS & GYNECOLOGY

## 2018-04-09 RX ORDER — ACETAMINOPHEN AND CODEINE PHOSPHATE 120; 12 MG/5ML; MG/5ML
1 SOLUTION ORAL DAILY
Qty: 84 TABLET | Refills: 4 | Status: SHIPPED | OUTPATIENT
Start: 2018-04-09

## 2018-04-09 ASSESSMENT — ANXIETY QUESTIONNAIRES
3. WORRYING TOO MUCH ABOUT DIFFERENT THINGS: NOT AT ALL
GAD7 TOTAL SCORE: 1
5. BEING SO RESTLESS THAT IT IS HARD TO SIT STILL: NOT AT ALL
IF YOU CHECKED OFF ANY PROBLEMS ON THIS QUESTIONNAIRE, HOW DIFFICULT HAVE THESE PROBLEMS MADE IT FOR YOU TO DO YOUR WORK, TAKE CARE OF THINGS AT HOME, OR GET ALONG WITH OTHER PEOPLE: NOT DIFFICULT AT ALL
1. FEELING NERVOUS, ANXIOUS, OR ON EDGE: NOT AT ALL
6. BECOMING EASILY ANNOYED OR IRRITABLE: NOT AT ALL
7. FEELING AFRAID AS IF SOMETHING AWFUL MIGHT HAPPEN: NOT AT ALL
2. NOT BEING ABLE TO STOP OR CONTROL WORRYING: NOT AT ALL

## 2018-04-09 ASSESSMENT — PATIENT HEALTH QUESTIONNAIRE - PHQ9: 5. POOR APPETITE OR OVEREATING: SEVERAL DAYS

## 2018-04-09 NOTE — MR AVS SNAPSHOT
After Visit Summary   4/9/2018    Rosmery Garcia    MRN: [id number]           Patient Information     Date Of Birth          1980        Visit Information        Provider Department      4/9/2018 8:30 AM Maria Ines Delong MD [facility]        Today's Diagnoses     Postpartum exam    -  1    Hx gestational diabetes           Follow-ups after your visit        Your next 10 appointments already scheduled     Apr 13, 2018  8:00 AM CDT   Glucose Tolerance Test with [facility]   [facility] ([facility])    [address] [place] [address]   [phone]              Future tests that were ordered for you today     Open Future Orders        Priority Expected Expires Ordered    Glucose tolerance, std non preg Routine  4/9/2019 4/9/2018            Who to contact     If you have questions or need follow up information about today's clinic visit or your schedule please contact [facility] directly at [phone].  Normal or non-critical lab and imaging results will be communicated to you by ElationEMRhart, letter or phone within 4 business days after the clinic has received the results. If you do not hear from us within 7 days, please contact the clinic through [employer] or phone. If you have a critical or abnormal lab result, we will notify you by phone as soon as possible.  Submit refill requests through [employer] or call your pharmacy and they will forward the refill request to us. Please allow 3 business days for your refill to be completed.          Additional Information About Your Visit        [employer] Information     [employer] gives you secure access to your electronic health record. If you see a primary care provider, you can also send messages to your care team and make appointments. If you have questions, please call your primary care clinic.  If you do not have a primary care provider,  please call [phone] and they will assist you.        Care EveryWhere ID     This is your Care EveryWhere ID. This could be used by other organizations to access your [place] medical records  [id number]        Your Vitals Were     Last Period Breastfeeding? BMI (Body Mass Index)             05/18/2017 (Exact Date) Yes 27.47 kg/m2          Blood Pressure from Last 3 Encounters:   04/09/18 134/88   03/19/18 110/78   03/06/18 108/78    Weight from Last 3 Encounters:   04/09/18 136 lb (61.7 kg)   03/19/18 132 lb (59.9 kg)   03/06/18 131 lb (59.4 kg)                 Today's Medication Changes          These changes are accurate as of 4/9/18  9:27 AM.  If you have any questions, ask your nurse or doctor.               Start taking these medicines.        Dose/Directions    norethindrone 0.35 MG per tablet   Commonly known as:  MICRONOR   Used for:  Postpartum exam   Started by:  Maria Ines Delong MD        Dose:  1 tablet   Take 1 tablet (0.35 mg) by mouth daily   Quantity:  84 tablet   Refills:  4            Where to get your medicines      These medications were sent to [facility]/pharmacy #[id number] - [place], [place] - [address] [address]     Phone:  [phone]     norethindrone 0.35 MG per tablet                Primary Care Provider Office Phone # Fax #    [facility] [phone]       [place] [address]  [facility] [address]  [facility] [address]        Equal Access to Services     FLORENCE FREIRE AH: Hadii jefe Tinoco, waaxda luqadaha, qaybta kaalselvin osorio. So [facility] [phone].    ATENCIÓN: Si habla español, tiene a thompson disposición servicios gratuitos de asistencia lingüística. Llame al [phone].    We comply with applicable federal civil rights laws and [place] laws. We do not discriminate on the basis of race, color, national origin,  age, disability, sex, sexual orientation, or gender identity.            Thank you!     Thank you for choosing [facility]  for your care. Our goal is always to provide you with excellent care. Hearing back from our patients is one way we can continue to improve our services. Please take a few minutes to complete the written survey that you may receive in the mail after your visit with us. Thank you!             Your Updated Medication List - Protect others around you: Learn how to safely use, store and throw away your medicines at www.disposemymeds.org.          This list is accurate as of 4/9/18  9:27 AM.  Always use your most recent med list.                   Brand Name Dispense Instructions for use Diagnosis    norethindrone 0.35 MG per tablet    MICRONOR    84 tablet    Take 1 tablet (0.35 mg) by mouth daily    Postpartum exam

## 2018-04-09 NOTE — PROGRESS NOTES
SUBJECTIVE:  Rosmery Garcia,  is here for a postpartum visit.  She had a  Section  on 18 delivering a healthy baby girl, named Yanna weighing 7 lbs 4.4 oz at term.      HPI:  Doing well since delivery. Denies depressed mood. Infant is doing well. Not yet back to work. No intercourse. Would like to discuss contraception as she was on the IUD in the past and had abnormal bleeding with pelvic pain. She was then transitioned to the the oral contraceptive pill.    Last PHQ-9 score on record=   PHQ-9 SCORE 2018   Total Score 3     LAKESHIA-7 SCORE 2018   Total Score 1       Delivery complications:  Yes, preeclampsia  Breast feeding:  Yes  Bladder problems:  No  Bowel problems/hemorrhoids:  No  Episiotomy/laceration/incision healed? Yes  Vaginal flow:  None  Madeira:  No  Contraception: discuss options  Emotional adjustment:  doing well and happy  Back to work:  May 2018    Constitutional: Fatigue  Head: Ringing    OBJECTIVE:  Vitals: /88  Wt 136 lb (61.7 kg)  LMP 2017 (Exact Date)  Breastfeeding? Yes  BMI 27.47 kg/m2  BMI= Body mass index is 27.47 kg/(m^2).  General - pleasant female in no acute distress.  Breast -  symmetric, no skin changes, no puckering and no erythema  Abdomen - Incision well-healed and soft, non-distended. No rebound or guarding.  Pelvic - EG: normal adult female, No Bartholin's gland enlargement. No discharge present. No inclusion cyst. BUS: within normal limits, Vagina: well rugated, no discharge, Cervix: no lesions or CMT, Uterus: firm, normal sized and nontender, midplane in position. Adnexae: no masses or tenderness.  Rectovaginal - deferred.    ASSESSMENT:    ICD-10-CM    1. Postpartum exam Z39.2 norethindrone (MICRONOR) 0.35 MG per tablet   2. Hx gestational diabetes Z86.32 Glucose tolerance, std non preg       PLAN:  May resume normal activities without restrictions.  Pap smear was not done today as it was not indicated.  We discussed  contraceptive options and for now she would like the progesterone only pill. Limited efficacy was discussed compared to OCP. Printed information for the Nexplanon and the Paragard were given.  Full counseling was provided, and all questions were answered.   Return to clinic in one year for an annual visit or sooner for alternative contraception.  Return for 75g GTT for history of gestational diabetes  There are no Patient Instructions on file for this visit.  Maria Ines Delong MD

## 2018-04-10 ASSESSMENT — ANXIETY QUESTIONNAIRES: GAD7 TOTAL SCORE: 1

## 2018-04-10 ASSESSMENT — PATIENT HEALTH QUESTIONNAIRE - PHQ9: SUM OF ALL RESPONSES TO PHQ QUESTIONS 1-9: 3

## 2018-04-12 DIAGNOSIS — O24.419 GDM (GESTATIONAL DIABETES MELLITUS): Primary | ICD-10-CM

## 2018-04-12 NOTE — TELEPHONE ENCOUNTER
Received request for refill of Humulin N 100 Units/ml Kwikpen. Pt was GDM and not on insulin now. GT scheduled for 4/13/18. Refill denied.

## 2018-04-13 DIAGNOSIS — Z86.32 HISTORY OF GESTATIONAL DIABETES: Primary | ICD-10-CM

## 2018-04-13 PROCEDURE — 82951 GLUCOSE TOLERANCE TEST (GTT): CPT | Performed by: OBSTETRICS & GYNECOLOGY

## 2018-04-13 PROCEDURE — 36415 COLL VENOUS BLD VENIPUNCTURE: CPT | Performed by: OBSTETRICS & GYNECOLOGY

## 2018-04-14 LAB
GLUCOSE 1H P 75 G GLC PO SERPL-MCNC: 207 MG/DL (ref 60–180)
GLUCOSE 2H P 75 G GLC PO SERPL-MCNC: 127 MG/DL (ref 60–155)
GLUCOSE P FAST SERPL-MCNC: 108 MG/DL (ref 60–95)

## 2018-04-16 DIAGNOSIS — R73.09 GLUCOSE TOLERANCE TEST ABNORMAL: Primary | ICD-10-CM

## 2018-07-26 ENCOUNTER — TELEPHONE (OUTPATIENT)
Dept: OBGYN | Facility: CLINIC | Age: 38
End: 2018-07-26

## 2018-07-26 DIAGNOSIS — Z30.012 EMERGENCY CONTRACEPTION: Primary | ICD-10-CM

## 2018-07-26 RX ORDER — LEVONORGESTREL 1.5 MG/1
1.5 TABLET ORAL ONCE
Qty: 1 TABLET | Refills: 0 | Status: CANCELLED | OUTPATIENT
Start: 2018-07-26 | End: 2018-07-26

## 2018-07-26 NOTE — TELEPHONE ENCOUNTER
Ulipristal Acetate Rx sent to pharmacy as this has better efficacy. Patient should come in for a visit to be placed on long acting contraception as this emergency contraception is only effective before ovulation.

## 2018-07-26 NOTE — TELEPHONE ENCOUNTER
"Pt's  calling for rx for Plan B.  Pt and  had Sexual intercourse last night, less than 24 hours ago. Have kids and know they do not want more.  Insurance purposes, wants Rx.  Pt not on OCPs as it makes pt \"crazy\".  He does not have insurance to get a vasectomy covered.    Routing to ME to advise and send Rx if applicable. Pharmacy chosen.      "

## 2018-07-26 NOTE — TELEPHONE ENCOUNTER
Left detailed message with  ivana's vm informing of Rx sent to pharmacy and encouraging his wife to make an appt to set up long acting contraception. This is only emergency contraception and only effective before ovulation.  Encouraged to call with questions regarding this message.

## 2018-07-28 ENCOUNTER — NURSE TRIAGE (OUTPATIENT)
Dept: NURSING | Facility: CLINIC | Age: 38
End: 2018-07-28

## 2018-07-28 RX ORDER — LEVONORGESTREL 1.5 MG/1
1.5 TABLET ORAL ONCE
Qty: 1 TABLET | Refills: 0 | Status: SHIPPED | OUTPATIENT
Start: 2018-07-28 | End: 2018-07-28

## 2018-07-28 NOTE — TELEPHONE ENCOUNTER
" Alvaro calling to request Plan B instead of the Alberta that was ordered yesterday. Reports \"we had an oops and need it today, but she cannot take the Alberta while breast feeding.\" Advised that Plan B was OTC. Caller stated \"we need to go through insurance\". Caller reports patient has no PCP.     This nurse called the on call provider (Dr. Elizabeth Powell) for the [facility] [facility] clinic at 5:20pm via  at [phone] to call the provider's cell (per smart web). Per Dr. Powell, ok for Plan B one step Rx once. This nurse then spoke to Alvaro and relayed the Rx information. Per Alvaro, Rx to be sent to [facility] in [place]. Reminded caller to have Rosmery make an appointment with OB/GYN to set up long acting contraception plan and talk about vasectomy referrals. Alvaro had no further questions.    Lindsey Lyles RN  [facility]    Reason for Disposition    [1] Request for URGENT new prescription or refill of \"essential\" medication (i.e., likelihood of harm to patient if not taken) AND [2] triager unable to fill per unit policy     Requesting Plan B Rx    Additional Information    Negative: Drug overdose and nurse unable to answer question    Negative: Caller requesting information not related to medicine    Negative: Caller requesting a prescription for Strep throat and has a positive culture result    Negative: Rash while taking a medication or within 3 days of stopping it    Negative: Immunization reaction suspected    Negative: [1] Asthma and [2] having symptoms of asthma (cough, wheezing, etc)    Negative: MORE THAN A DOUBLE DOSE of a prescription or over-the-counter (OTC) drug    Negative: [1] DOUBLE DOSE (an extra dose or lesser amount) of over-the-counter (OTC) drug AND [2] any symptoms (e.g., dizziness, nausea, pain, sleepiness)    Negative: [1] DOUBLE DOSE (an extra dose or lesser amount) of prescription drug AND [2] any symptoms (e.g., dizziness, nausea, pain, sleepiness)    " Negative: Took another person's prescription drug    Negative: [1] DOUBLE DOSE (an extra dose or lesser amount) of prescription drug AND [2] NO symptoms (Exception: a double dose of antibiotics)    Negative: Diabetes drug error or overdose (e.g., insulin or extra dose)    Protocols used: MEDICATION QUESTION CALL-ADULT-

## 2018-07-28 NOTE — TELEPHONE ENCOUNTER
" Alvaro calling to request Plan B instead of the Alberta that was ordered yesterday. Reports \"we had an oops and need it today, but she cannot take the Alberta while breast feeding.\" Advised that Plan B was OTC. Caller stated \"we need to go through insurance\". Caller reports patient has no PCP.     This nurse called the on call provider (Dr. Elizabeth Powell) for the [place] [facility] clinic at 5:20pm via  at [phone] to call the provider's cell (per smart web). Per Dr. Powell, ok for Plan B one step Rx once. This nurse then spoke to Alvaro and relayed the Rx information. Per Alvaro, Rx to be sent to [facility] in [place]. Reminded caller to have Rosmery make an appointment with OB/GYN to set up long acting contraception plan and talk about vasectomy referrals. Alvaro had no further questions.    Lindsey Lyles RN  [facility]    "

## 2018-07-28 NOTE — TELEPHONE ENCOUNTER
Returned call to Patient, no answer, left generic message to call back if still need assistance.    Deepali Connell RN/ [facility]    ___________________________________________         Message from Joselyn Cannon sent at 7/28/2018 12:53 PM CDT -----  Reason for Call:  Other prescription    Detailed comments: Patient was prescribed a Plan B pill and she cant take it because she is breastfeeding. She needs another type of pill.     Phone Number Patient can be reached at: on file    Best Time: anytime    Can we leave a detailed message on this number? Yes    Call taken on 7/28/2018 at 12:52 PM by Joselyn Cannon

## 2020-02-24 ENCOUNTER — HEALTH MAINTENANCE LETTER (OUTPATIENT)
Age: 40
End: 2020-02-24

## 2020-12-13 ENCOUNTER — HEALTH MAINTENANCE LETTER (OUTPATIENT)
Age: 40
End: 2020-12-13

## 2021-04-17 ENCOUNTER — HEALTH MAINTENANCE LETTER (OUTPATIENT)
Age: 41
End: 2021-04-17

## 2021-09-26 ENCOUNTER — HEALTH MAINTENANCE LETTER (OUTPATIENT)
Age: 41
End: 2021-09-26

## 2022-05-08 ENCOUNTER — HEALTH MAINTENANCE LETTER (OUTPATIENT)
Age: 42
End: 2022-05-08

## 2023-04-23 ENCOUNTER — HEALTH MAINTENANCE LETTER (OUTPATIENT)
Age: 43
End: 2023-04-23

## 2023-06-02 ENCOUNTER — HEALTH MAINTENANCE LETTER (OUTPATIENT)
Age: 43
End: 2023-06-02

## 2024-02-11 ENCOUNTER — HEALTH MAINTENANCE LETTER (OUTPATIENT)
Age: 44
End: 2024-02-11

## 2024-06-07 NOTE — MR AVS SNAPSHOT
After Visit Summary   2/28/2018    Rosmery Garcia    MRN: [id number]           Patient Information     Date Of Birth          1980        Visit Information        Provider Department      2/28/2018 8:30 AM [facility]        Today's Diagnoses     Preeclampsia in postpartum period    -  1       Follow-ups after your visit        Your next 10 appointments already scheduled     Mar 07, 2018  8:00 AM CST   Nurse Only with [facility]   [facility] ([facility])    [address]  [facility] [id number] [phone]              Who to contact     If you have questions or need follow up information about today's clinic visit or your schedule please contact [facility] directly at [phone].  Normal or non-critical lab and imaging results will be communicated to you by MyChart, letter or phone within 4 business days after the clinic has received the results. If you do not hear from us within 7 days, please contact the clinic through [employer] or phone. If you have a critical or abnormal lab result, we will notify you by phone as soon as possible.  Submit refill requests through [employer] or call your pharmacy and they will forward the refill request to us. Please allow 3 business days for your refill to be completed.          Additional Information About Your Visit        MyChart Information     [employer] gives you secure access to your electronic health record. If you see a primary care provider, you can also send messages to your care team and make appointments. If you have questions, please call your primary care clinic.  If you do not have a primary care provider, please call [phone] and they will assist you.        Care EveryWhere ID     This is your Care EveryWhere ID. This could be used by other organizations to access your [facility] medical records  [id number]        Your  Catheter removed over wire. Vitals Were     Pulse Last Period                96 05/18/2017 (Exact Date)           Blood Pressure from Last 3 Encounters:   02/28/18 118/76   02/19/18 134/88   02/12/18 122/80    Weight from Last 3 Encounters:   02/19/18 139 lb 3.2 oz (63.1 kg)   02/12/18 155 lb (70.3 kg)   02/05/18 153 lb (69.4 kg)              Today, you had the following     No orders found for display       Primary Care Provider Office Phone # Fax #    [facility] [phone]       [facility] [place] [address] [facility] [address]  [facility] [id number]        Equal Access to Services     FLORENCE FREIRE : Hadii jefe ngoo Earnest, waaxda luqadaha, qaybta kaalmada adepatyabri, selvin walsh. So [facility] [phone].    ATENCIÓN: Si habla español, tiene a thompson disposición servicios gratuitos de asistencia lingüística. Llame al [phone].    We comply with applicable federal civil rights laws and [place] laws. We do not discriminate on the basis of race, color, national origin, age, disability, sex, sexual orientation, or gender identity.            Thank you!     Thank you for choosing [facility]  for your care. Our goal is always to provide you with excellent care. Hearing back from our patients is one way we can continue to improve our services. Please take a few minutes to complete the written survey that you may receive in the mail after your visit with us. Thank you!             Your Updated Medication List - Protect others around you: Learn how to safely use, store and throw away your medicines at www.disposemymeds.org.          This list is accurate as of 2/28/18  9:00 AM.  Always use your most recent med list.                   Brand Name Dispense Instructions for use Diagnosis    alcohol swab prep pads     100 each    Use to swab area of injection/lamar as directed.    Insulin controlled gestational diabetes mellitus (GDM) in third  trimester       blood glucose monitoring lancets     200 each    Use to test blood sugar 4 times daily or as directed.  Ok to substitute alternative if insurance prefers.    Gestational diabetes       blood glucose monitoring test strip    ONETOUCH VERIO IQ    150 strip    Use to test blood sugars 4 times daily or as directed.    Gestational diabetes       breast pump Misc     1 each    1 each every 3 hours    Lactating mother       insulin pen needle 32G X 4 MM    ULTICARE MICRO    100 each    Use 1 pen needles daily or as directed.    Insulin controlled gestational diabetes mellitus (GDM) in third trimester       NIFEdipine ER 30 MG Tb24    ADALAT CC    30 tablet    Take 1 tablet (30 mg) by mouth daily    Pre-eclampsia in third trimester       oxyCODONE IR 5 MG tablet    ROXICODONE    30 tablet    Take 1 tablet (5 mg) by mouth every 4 hours as needed for other (pain control or improvement in physical function. Hold dose for analgesic side effects.)    H/O  section       prenatal multivitamin plus iron 27-0.8 MG Tabs per tablet      Take 1 tablet by mouth daily

## (undated) DEVICE — LIGHT HANDLE X2

## (undated) DEVICE — STPL SKIN 35W APPOSE 8886803712

## (undated) DEVICE — DRAPE SHEET REV FOLD 3/4 9349

## (undated) DEVICE — LINEN TOWEL PACK X5 5464

## (undated) DEVICE — SOL WATER IRRIG 1000ML BOTTLE 07139-09

## (undated) DEVICE — LINEN BABY BLANKET 5434

## (undated) DEVICE — PREP CHLORAPREP 26ML TINTED ORANGE  260815

## (undated) DEVICE — SUCTION CANISTER MEDIVAC LINER 3000ML W/LID 65651-530

## (undated) DEVICE — SOL NACL 0.9% IRRIG 1000ML BOTTLE 07138-09

## (undated) DEVICE — ESU GROUND PAD UNIVERSAL W/O CORD

## (undated) DEVICE — BLADE CLIPPER 4406

## (undated) DEVICE — DRSG KERLIX FLUFFS X5

## (undated) DEVICE — PAD CHUX UNDERPAD 23X24" 7136

## (undated) DEVICE — PACK C-SECTION LF PL15OTA83B

## (undated) DEVICE — PACK SET-UP STD 9102